# Patient Record
Sex: FEMALE | Race: WHITE | Employment: FULL TIME | URBAN - METROPOLITAN AREA
[De-identification: names, ages, dates, MRNs, and addresses within clinical notes are randomized per-mention and may not be internally consistent; named-entity substitution may affect disease eponyms.]

---

## 2018-09-10 ENCOUNTER — HOSPITAL ENCOUNTER (EMERGENCY)
Age: 48
Discharge: HOME | End: 2018-09-10
Payer: COMMERCIAL

## 2018-09-10 VITALS
SYSTOLIC BLOOD PRESSURE: 152 MMHG | RESPIRATION RATE: 18 BRPM | TEMPERATURE: 97.3 F | OXYGEN SATURATION: 100 % | HEIGHT: 66 IN | HEART RATE: 91 BPM | WEIGHT: 160 LBS | DIASTOLIC BLOOD PRESSURE: 84 MMHG | BODY MASS INDEX: 25.71 KG/M2

## 2018-09-10 DIAGNOSIS — M54.41 ACUTE RIGHT-SIDED LOW BACK PAIN WITH RIGHT-SIDED SCIATICA: Primary | ICD-10-CM

## 2018-09-10 PROCEDURE — 6370000000 HC RX 637 (ALT 250 FOR IP): Performed by: PHYSICIAN ASSISTANT

## 2018-09-10 PROCEDURE — 99283 EMERGENCY DEPT VISIT LOW MDM: CPT

## 2018-09-10 RX ORDER — LIDOCAINE 50 MG/G
1 PATCH TOPICAL ONCE
Status: DISCONTINUED | OUTPATIENT
Start: 2018-09-10 | End: 2018-09-10 | Stop reason: HOSPADM

## 2018-09-10 RX ORDER — METHYLPREDNISOLONE 4 MG/1
TABLET ORAL
Qty: 1 KIT | Refills: 0 | Status: SHIPPED | OUTPATIENT
Start: 2018-09-10 | End: 2018-09-16

## 2018-09-10 RX ORDER — CYCLOBENZAPRINE HCL 10 MG
10 TABLET ORAL 3 TIMES DAILY PRN
Qty: 20 TABLET | Refills: 0 | Status: SHIPPED | OUTPATIENT
Start: 2018-09-10 | End: 2018-09-17

## 2018-09-10 RX ORDER — NAPROXEN 500 MG/1
500 TABLET ORAL 2 TIMES DAILY
Qty: 20 TABLET | Refills: 0 | Status: SHIPPED | OUTPATIENT
Start: 2018-09-10 | End: 2020-06-10

## 2018-09-10 RX ORDER — LIDOCAINE 50 MG/G
1 PATCH TOPICAL DAILY
Qty: 30 PATCH | Refills: 0 | Status: ON HOLD | OUTPATIENT
Start: 2018-09-10 | End: 2021-11-30 | Stop reason: HOSPADM

## 2018-09-10 ASSESSMENT — PAIN SCALES - GENERAL: PAINLEVEL_OUTOF10: 7

## 2018-09-10 ASSESSMENT — PAIN DESCRIPTION - LOCATION: LOCATION: BACK

## 2018-09-10 ASSESSMENT — PAIN DESCRIPTION - PAIN TYPE: TYPE: ACUTE PAIN

## 2018-09-11 NOTE — PROGRESS NOTES
Lidoderm patch applied. Explained to patient, disharge instructions and medications reviewed. Patient with no questions at this time. Patient able to ambulate to exit with family.

## 2018-09-11 NOTE — ED PROVIDER NOTES
laceration. Neurological: She is alert and oriented to person, place, and time. Reflex Scores:       Patellar reflexes are 2+ on the right side and 2+ on the left side. Achilles reflexes are 2+ on the right side and 2+ on the left side. Bilateral lower extremity strength is 5 out of 5 and is symmetric  No sensory deficit in her lower extremities. Skin: Skin is warm and dry. She is not diaphoretic. No pallor. Psychiatric: She has a normal mood and affect. Her behavior is normal. Thought content normal.   Nursing note and vitals reviewed. DIAGNOSTIC RESULTS   LABS:    Labs Reviewed - No data to display    All other labs were within normal range or not returned as of this dictation. EKG: All EKG's are interpreted by the Emergency Department Physician who either signs or Co-signs this chart in the absence of a cardiologist.  Please see their note for interpretation of EKG. RADIOLOGY:   Non-plain film images such as CT, Ultrasound and MRI are read by the radiologist. Plain radiographic images are visualized and preliminarily interpreted by the  ED Provider with the below findings:        Interpretation per the Radiologist below, if available at the time of this note:    No orders to display     No results found. PROCEDURES   Unless otherwise noted below, none     Procedures    CRITICAL CARE TIME   N/A    CONSULTS:  None      EMERGENCY DEPARTMENT COURSE and DIFFERENTIAL DIAGNOSIS/MDM:   Vitals:    Vitals:    09/10/18 1953   BP: (!) 152/84   Pulse: 91   Resp: 18   Temp: 97.3 °F (36.3 °C)   TempSrc: Tympanic   SpO2: 100%   Weight: 160 lb (72.6 kg)   Height: 5' 6\" (1.676 m)       Patient was given the following medications:  Medications - No data to display    This patient has back pain which appears to be musculoskeletal.  She is tender with a specimen her right lower back. No evidence for sinusitis, erysipelas, abscess, necrotizing fasciitis or lymphangitis.   She has no bony tenderness and

## 2020-05-28 ENCOUNTER — TELEPHONE (OUTPATIENT)
Dept: OBGYN CLINIC | Age: 50
End: 2020-05-28

## 2020-05-29 ENCOUNTER — OFFICE VISIT (OUTPATIENT)
Dept: OBGYN CLINIC | Age: 50
End: 2020-05-29
Payer: COMMERCIAL

## 2020-05-29 VITALS
HEART RATE: 99 BPM | DIASTOLIC BLOOD PRESSURE: 82 MMHG | HEIGHT: 66 IN | SYSTOLIC BLOOD PRESSURE: 132 MMHG | WEIGHT: 182.2 LBS | TEMPERATURE: 98.3 F | BODY MASS INDEX: 29.28 KG/M2

## 2020-05-29 LAB
BILIRUBIN URINE: NEGATIVE
BLOOD, URINE: NEGATIVE
CLARITY: CLEAR
COLOR: YELLOW
EPITHELIAL CELLS, UA: 1 /HPF (ref 0–5)
GLUCOSE URINE: NEGATIVE MG/DL
HYALINE CASTS: 2 /LPF (ref 0–8)
KETONES, URINE: NEGATIVE MG/DL
LEUKOCYTE ESTERASE, URINE: NEGATIVE
MICROSCOPIC EXAMINATION: NORMAL
NITRITE, URINE: NEGATIVE
PH UA: 7 (ref 5–8)
PROTEIN UA: NEGATIVE MG/DL
RBC UA: 1 /HPF (ref 0–4)
SPECIFIC GRAVITY UA: 1.01 (ref 1–1.03)
URINE TYPE: NORMAL
UROBILINOGEN, URINE: 0.2 E.U./DL
WBC UA: 2 /HPF (ref 0–5)

## 2020-05-29 PROCEDURE — 99204 OFFICE O/P NEW MOD 45 MIN: CPT | Performed by: OBSTETRICS & GYNECOLOGY

## 2020-05-29 PROCEDURE — 76856 US EXAM PELVIC COMPLETE: CPT | Performed by: OBSTETRICS & GYNECOLOGY

## 2020-05-29 NOTE — LETTER
I understand that if I am not forthcoming with information regarding a pain contract currently in place, it may result in a violation of the contract with that provider. The violation may result in cessation of prescribing any controlled medications. Violation may also result in the dismissal of care from the [facility] provider. It is your responsibility as the patient under contract to understand the contract and adhere to the agreement.      Patient Signature: __________________________________  Date: ________________

## 2020-05-29 NOTE — LETTER
Clindamycin 900 mg IVPB + Gentamicin 1.5 mg/kg IVPB x 1    Metronidazole 500 mg IVPB x1 and Gentamicin 1.5 mg/kg IVPB x1   ADDITIONAL MEDICATIONS:    DVT PREVENTION:       ? Knee Antithrombic compression wraps    Additional Orders: _____________________________________________________________________ ________________________________________________________________________________                Signature _____________________________________________Date _____________________    Please fax at time of booking the case to the Scheduling office at [phone] [facility] at [phone]                                             Updated: 3/2015      PRE-OPERATIVE / PROCEDURE HISTORY & PHYSICAL  FAX TO: ([phone]    Patient Name:___Masonavis KODY Mena_______________     :___1970_______    Date:___________________     Surgeon:_Dr. Mayra Parkinson____________  CHIEF COMPLAINT: _abnormal uterine bleeding, pelvic pain, urinary incontinence___  PLANNED SURGICAL PROCEDURE:__Laparoscopic assisted vaginal hysterectomy with bilateral salpingectomy, possible anterior repair, possible cystoscopy, possible posterior repair__________  Date of Procedure:___2020___________________    Medical History:   ? Hypertension      ? Hyperlipidemia     ? Diabetes Mellitus    ? Coronary Artery Disease  ______________________________________________________________________  ______________________________________________________________________    Surgical History:  ______________________________________________________________________  ______________________________________________________________________  Social Hx:        ?   Smoker ppd_____       ? Alcohol, drinks/day_____  Family Hx:       ? Anesthesia problems      ? Bleeding problems     ? Clotting problems                           ?    Other ___________________________________________________    ROS: ______________________________________________________________________  ______________________________________________________________________    Medications: (Name/Dose/Frequency):  ______________________________________________________________________  ______________________________________________________________________  ______________________________________________________________________    Allergies:  ______________________________________________________________________    Physical Exam:    Vital Signs:   BP______P______T______Resp_____Ht______Wt______BMI_______    General:  HEENT:  Neck:                                                                                             Lymph:  CV:                                                                                                Skin:  Pulmonary:                                                                                   GYN:  Abdomen:                                                                                     Extremities:  Neuro:          Patient Name: __Shraddha Mena______________ : ______1970______      Impression:                    Plan of Care:                  Okay for Planned Surgery/Anesthesia:_____________________________      Perioperative Beta-Blocker therapy should be initiated in at-risk patients undergoing major abdominal or vascular procedures, joint replacement, or major chest procedures. At-risk patients are those with established CAD, DM, or have two or more risk factors  age >71, smoker, HTN or hyperlipidemia. Recommendations:  ? If on Beta-Blocker already, continue current drug and titrate to target  ? If not currently on Beta-Blocker, recommend Atenolol or Metoprolol and titrate to target (target systolic BP <522, Pulse <81)  ?  Please begin therapy prior to surgery and titrate to goal  ? Beta-Blocker should be continued for at least 30 days post-operatively as tolerated ? Prescription and therapy instituted  ? The patient would benefit from Beta-Blocker therapy, but their use is contraindicated (i.e. reactive airway disease exacerbated by Beta-Blockers, 2nd or 3rd degree heart block, severe aortic stenosis, other______________________________)  ?  The patient is to continue their current Beta-Blocker therapy        Physician Signature: _______________________ Date: __________Time:_________

## 2020-05-29 NOTE — PROGRESS NOTES
Negative for abdominal pain, blood in stool and constipation. Endocrine: Negative for cold intolerance and heat intolerance. Genitourinary: Positive for menstrual problem, pelvic pain and vaginal bleeding. Musculoskeletal: Negative for back pain. Skin: Negative for color change. Neurological: Negative for dizziness, light-headedness and headaches. Hematological: Does not bruise/bleed easily. Psychiatric/Behavioral: Negative for behavioral problems and dysphoric mood. The patient is not nervous/anxious. Gynecologic History:   (+) history of abnormal pap smears     Last PAP 15 yrs ago    Pre cancer cells noted at 27 yo, laser therapy   Denies  history of of STIs   Last Mammogram age 44 - normal     Menstrual history:  Gynecologic History  Menstrual History: see above     Sexual History:   Contraception: Tubal     Currently IS sexually active  o Man x 30 yrs    (+) sexual problems - dryness /occasional discomfort     Obstetrical History:  OB History        3    Para   3    Term   3            AB        Living           SAB        TAB        Ectopic        Molar        Multiple        Live Births                    Past Medical History:   Past Medical History:   Diagnosis Date    Abnormal Pap smear of cervix     Menopausal symptoms    - Pre cancer cells noted at 27 yo, laser therapy       Medications:  Current Outpatient Medications   Medication Sig Dispense Refill    lidocaine (LIDODERM) 5 % Place 1 patch onto the skin daily 12 hours on, 12 hours off. (Patient not taking: Reported on 6/10/2020) 30 patch 0    vitamin B-12 (CYANOCOBALAMIN) 100 MCG tablet Take 50 mcg by mouth daily      meclizine (ANTIVERT) 50 MG tablet Take 1 tablet by mouth 2 times daily as needed for Dizziness or Nausea (Patient not taking: Reported on 6/10/2020) 10 tablet 0     No current facility-administered medications for this visit.         Allergies:  Penicillins    Surgical History:  Past Surgical

## 2020-05-31 LAB
ORGANISM: ABNORMAL
URINE CULTURE, ROUTINE: ABNORMAL

## 2020-06-01 ENCOUNTER — TELEPHONE (OUTPATIENT)
Dept: FAMILY MEDICINE CLINIC | Age: 50
End: 2020-06-01

## 2020-06-01 NOTE — TELEPHONE ENCOUNTER
Pt is requesting to establish with [facility], would you be willing to accept?  Please advise    [phone]

## 2020-06-03 LAB
HPV COMMENT: NORMAL
HPV TYPE 16: NOT DETECTED
HPV TYPE 18: NOT DETECTED
HPVOH (OTHER TYPES): NOT DETECTED

## 2020-06-09 ENCOUNTER — TELEPHONE (OUTPATIENT)
Dept: OBGYN CLINIC | Age: 50
End: 2020-06-09

## 2020-06-10 ENCOUNTER — OFFICE VISIT (OUTPATIENT)
Dept: OBGYN CLINIC | Age: 50
End: 2020-06-10

## 2020-06-10 VITALS
WEIGHT: 181.2 LBS | HEART RATE: 93 BPM | DIASTOLIC BLOOD PRESSURE: 82 MMHG | BODY MASS INDEX: 29.25 KG/M2 | SYSTOLIC BLOOD PRESSURE: 136 MMHG | TEMPERATURE: 98.5 F

## 2020-06-10 PROCEDURE — 99024 POSTOP FOLLOW-UP VISIT: CPT | Performed by: OBSTETRICS & GYNECOLOGY

## 2020-06-10 RX ORDER — POLYETHYLENE GLYCOL 3350, SODIUM SULFATE ANHYDROUS, SODIUM BICARBONATE, SODIUM CHLORIDE, POTASSIUM CHLORIDE 236; 22.74; 6.74; 5.86; 2.97 G/4L; G/4L; G/4L; G/4L; G/4L
4 POWDER, FOR SOLUTION ORAL ONCE
Qty: 4000 ML | Refills: 0 | Status: SHIPPED | OUTPATIENT
Start: 2020-06-10 | End: 2020-06-10

## 2020-06-14 PROBLEM — N93.9 ABNORMAL UTERINE BLEEDING (AUB): Status: ACTIVE | Noted: 2020-06-14

## 2020-06-14 PROBLEM — N81.11 CYSTOCELE, MIDLINE: Status: ACTIVE | Noted: 2020-06-14

## 2020-06-14 PROBLEM — R10.2 PELVIC PAIN: Status: ACTIVE | Noted: 2020-06-14

## 2020-06-14 PROBLEM — D25.1 INTRAMURAL LEIOMYOMA OF UTERUS: Status: ACTIVE | Noted: 2020-06-14

## 2020-06-14 ASSESSMENT — ENCOUNTER SYMPTOMS
ABDOMINAL PAIN: 0
BLOOD IN STOOL: 0
CONSTIPATION: 0
SHORTNESS OF BREATH: 0
WHEEZING: 0
COLOR CHANGE: 0
BACK PAIN: 0

## 2020-06-15 ENCOUNTER — OFFICE VISIT (OUTPATIENT)
Dept: PRIMARY CARE CLINIC | Age: 50
End: 2020-06-15

## 2020-06-15 ENCOUNTER — OFFICE VISIT (OUTPATIENT)
Dept: FAMILY MEDICINE CLINIC | Age: 50
End: 2020-06-15
Payer: COMMERCIAL

## 2020-06-15 VITALS
BODY MASS INDEX: 23.78 KG/M2 | SYSTOLIC BLOOD PRESSURE: 134 MMHG | HEART RATE: 95 BPM | OXYGEN SATURATION: 99 % | HEIGHT: 66 IN | TEMPERATURE: 98.5 F | DIASTOLIC BLOOD PRESSURE: 86 MMHG | WEIGHT: 148 LBS

## 2020-06-15 PROCEDURE — 99242 OFF/OP CONSLTJ NEW/EST SF 20: CPT | Performed by: PHYSICIAN ASSISTANT

## 2020-06-15 ASSESSMENT — PATIENT HEALTH QUESTIONNAIRE - PHQ9
SUM OF ALL RESPONSES TO PHQ QUESTIONS 1-9: 0
2. FEELING DOWN, DEPRESSED OR HOPELESS: 0
SUM OF ALL RESPONSES TO PHQ QUESTIONS 1-9: 0
SUM OF ALL RESPONSES TO PHQ9 QUESTIONS 1 & 2: 0
1. LITTLE INTEREST OR PLEASURE IN DOING THINGS: 0

## 2020-06-15 NOTE — PROGRESS NOTES
[facility]   Pre-operative History and Physical      DIAGNOSIS:     Diagnosis Orders   1. Preop examination     2. Abnormal vaginal bleeding           PROCEDURE:  hysterectomy      History Obtained From:  patient    HISTORY OF PRESENT ILLNESS:    Angella Delgadillo 1970 is a 53 y.o. female who presents for pre-operative evaluation    Past Medical History:    Past Medical History:   Diagnosis Date    Abnormal Pap smear of cervix     Menopausal symptoms      Past Surgical History:    Past Surgical History:   Procedure Laterality Date    TUBAL LIGATION      WISDOM TOOTH EXTRACTION         Current Medication  Current Outpatient Medications   Medication Sig Dispense Refill    vitamin B-12 (CYANOCOBALAMIN) 100 MCG tablet Take 50 mcg by mouth daily      lidocaine (LIDODERM) 5 % Place 1 patch onto the skin daily 12 hours on, 12 hours off. (Patient not taking: Reported on 6/10/2020) 30 patch 0    meclizine (ANTIVERT) 50 MG tablet Take 1 tablet by mouth 2 times daily as needed for Dizziness or Nausea (Patient not taking: Reported on 6/10/2020) 10 tablet 0     No current facility-administered medications for this visit. Allergies:  Penicillins  History of allergic reaction to anesthesia:  No  Teeth: no dentures or caps    Social History:   Social History     Tobacco Use   Smoking Status Never Smoker   Smokeless Tobacco Never Used     The patient states she drinks 0 per week.   Family History:   Family History   Problem Relation Age of Onset    Heart Attack Paternal Grandfather     Dementia Paternal Grandmother     Osteoporosis Maternal Grandmother     No Known Problems Maternal Grandfather     Cancer Mother        REVIEW OF SYSTEMS:    CONSTITUTIONAL:  negative  EYES:  negative  HEENT:  negative  RESPIRATORY:  negative  CARDIOVASCULAR:  negative  GASTROINTESTINAL:  negative  GENITOURINARY:  negative  INTEGUMENT/BREAST:  negative  HEMATOLOGIC/LYMPHATIC:  negative  ALLERGIC/IMMUNOLOGIC:

## 2020-06-15 NOTE — PROGRESS NOTES
Patient is having a/an HYSTERECTOMY with Dr. Jakub Mcclellan on 6/18/2020 and was seen at clinic for pre op covid test. . Patient instructed to self quarantine until the procedure.

## 2020-06-15 NOTE — PROGRESS NOTES
Preoperative Screening for Elective Surgery/Invasive Procedures While COVID-19 present in the community     Have you tested positive or have been told to self-isolate for COVID-19 like symptoms within the past 28 days? NO   Do you currently have any of the following symptoms? NO  o Fever >100.0 F or 99.9 F in immunocompromised patients? NO  o New onset cough, shortness of breath or difficulty breathing? NO  o New onset sore throat, myalgia (muscle aches and pains), headache, loss of taste/smell or diarrhea? NO   Have you had a potential exposure to COVID-19 within the past 14 days by:NO  o Close contact with a confirmed case? NO  o Close contact with a healthcare worker,  or essential infrastructure worker (grocery store, [employer], gas station, public utilities or transportation)? NO  o Do you reside in a congregate setting such as; skilled nursing facility, adult home, correctional facility, homeless shelter or other institutional setting? NO  o Have you had recent travel to a known COVID-19 hotspot? NO    Indicate if the patient has a positive screen by answering yes to one or more of the above questions. Patients who test positive or screen positive prior to surgery or on the day of surgery should be evaluated in conjunction with the surgeon/proceduralist/anesthesiologist to determine the urgency of the procedure.

## 2020-06-17 ENCOUNTER — ANESTHESIA EVENT (OUTPATIENT)
Dept: OPERATING ROOM | Age: 50
End: 2020-06-17
Payer: COMMERCIAL

## 2020-06-17 LAB
SARS-COV-2: NOT DETECTED
SOURCE: NORMAL

## 2020-06-18 ENCOUNTER — HOSPITAL ENCOUNTER (OUTPATIENT)
Age: 50
Setting detail: OBSERVATION
Discharge: HOME | End: 2020-06-20
Attending: OBSTETRICS & GYNECOLOGY | Admitting: OBSTETRICS & GYNECOLOGY
Payer: COMMERCIAL

## 2020-06-18 ENCOUNTER — ANESTHESIA (OUTPATIENT)
Dept: OPERATING ROOM | Age: 50
End: 2020-06-18
Payer: COMMERCIAL

## 2020-06-18 VITALS
OXYGEN SATURATION: 100 % | TEMPERATURE: 98.6 F | RESPIRATION RATE: 14 BRPM | SYSTOLIC BLOOD PRESSURE: 125 MMHG | DIASTOLIC BLOOD PRESSURE: 53 MMHG

## 2020-06-18 LAB
ABO/RH: NORMAL
ABO/RH: NORMAL
ANION GAP SERPL CALCULATED.3IONS-SCNC: 12 MMOL/L (ref 3–16)
ANTIBODY SCREEN: NORMAL
BUN BLDV-MCNC: 7 MG/DL (ref 7–20)
CALCIUM SERPL-MCNC: 9 MG/DL (ref 8.3–10.6)
CHLORIDE BLD-SCNC: 107 MMOL/L (ref 99–110)
CO2: 20 MMOL/L (ref 21–32)
CREAT SERPL-MCNC: 0.6 MG/DL (ref 0.6–1.1)
GFR AFRICAN AMERICAN: >60
GFR NON-AFRICAN AMERICAN: >60
GLUCOSE BLD-MCNC: 103 MG/DL (ref 70–99)
HCT VFR BLD CALC: 21.9 % (ref 36–48)
HCT VFR BLD CALC: 26 % (ref 36–48)
HCT VFR BLD CALC: 27.5 % (ref 36–48)
HEMATOLOGY PATH CONSULT: NORMAL
HEMATOLOGY PATH CONSULT: YES
HEMOGLOBIN: 6.2 G/DL (ref 12–16)
HEMOGLOBIN: 7.7 G/DL (ref 12–16)
HEMOGLOBIN: 8 G/DL (ref 12–16)
MCH RBC QN AUTO: 17.1 PG (ref 26–34)
MCHC RBC AUTO-ENTMCNC: 28.1 G/DL (ref 31–36)
MCV RBC AUTO: 60.9 FL (ref 80–100)
PDW BLD-RTO: 17.5 % (ref 12.4–15.4)
PLATELET # BLD: 532 K/UL (ref 135–450)
PLATELET SLIDE REVIEW: ABNORMAL
PMV BLD AUTO: 7.4 FL (ref 5–10.5)
POTASSIUM REFLEX MAGNESIUM: 3.9 MMOL/L (ref 3.5–5.1)
PREGNANCY, URINE: NEGATIVE
RBC # BLD: 3.59 M/UL (ref 4–5.2)
SLIDE REVIEW: ABNORMAL
SODIUM BLD-SCNC: 139 MMOL/L (ref 136–145)
WBC # BLD: 7.3 K/UL (ref 4–11)

## 2020-06-18 PROCEDURE — 86901 BLOOD TYPING SEROLOGIC RH(D): CPT

## 2020-06-18 PROCEDURE — 58573 TLH W/T/O UTERUS OVER 250 G: CPT | Performed by: OBSTETRICS & GYNECOLOGY

## 2020-06-18 PROCEDURE — 3600000004 HC SURGERY LEVEL 4 BASE: Performed by: OBSTETRICS & GYNECOLOGY

## 2020-06-18 PROCEDURE — 2720000010 HC SURG SUPPLY STERILE: Performed by: OBSTETRICS & GYNECOLOGY

## 2020-06-18 PROCEDURE — 6360000002 HC RX W HCPCS

## 2020-06-18 PROCEDURE — 2500000003 HC RX 250 WO HCPCS: Performed by: NURSE ANESTHETIST, CERTIFIED REGISTERED

## 2020-06-18 PROCEDURE — 85014 HEMATOCRIT: CPT

## 2020-06-18 PROCEDURE — 2700000000 HC OXYGEN THERAPY PER DAY

## 2020-06-18 PROCEDURE — 3700000000 HC ANESTHESIA ATTENDED CARE: Performed by: OBSTETRICS & GYNECOLOGY

## 2020-06-18 PROCEDURE — 86900 BLOOD TYPING SEROLOGIC ABO: CPT

## 2020-06-18 PROCEDURE — 2500000003 HC RX 250 WO HCPCS: Performed by: OBSTETRICS & GYNECOLOGY

## 2020-06-18 PROCEDURE — 6360000002 HC RX W HCPCS: Performed by: OBSTETRICS & GYNECOLOGY

## 2020-06-18 PROCEDURE — G0378 HOSPITAL OBSERVATION PER HR: HCPCS

## 2020-06-18 PROCEDURE — 6360000002 HC RX W HCPCS: Performed by: ANESTHESIOLOGY

## 2020-06-18 PROCEDURE — 80048 BASIC METABOLIC PNL TOTAL CA: CPT

## 2020-06-18 PROCEDURE — 3600000014 HC SURGERY LEVEL 4 ADDTL 15MIN: Performed by: OBSTETRICS & GYNECOLOGY

## 2020-06-18 PROCEDURE — 2580000003 HC RX 258: Performed by: NURSE ANESTHETIST, CERTIFIED REGISTERED

## 2020-06-18 PROCEDURE — 86850 RBC ANTIBODY SCREEN: CPT

## 2020-06-18 PROCEDURE — 2580000003 HC RX 258: Performed by: ANESTHESIOLOGY

## 2020-06-18 PROCEDURE — 6370000000 HC RX 637 (ALT 250 FOR IP): Performed by: OBSTETRICS & GYNECOLOGY

## 2020-06-18 PROCEDURE — P9016 RBC LEUKOCYTES REDUCED: HCPCS

## 2020-06-18 PROCEDURE — 84703 CHORIONIC GONADOTROPIN ASSAY: CPT

## 2020-06-18 PROCEDURE — 94761 N-INVAS EAR/PLS OXIMETRY MLT: CPT

## 2020-06-18 PROCEDURE — 6360000002 HC RX W HCPCS: Performed by: NURSE ANESTHETIST, CERTIFIED REGISTERED

## 2020-06-18 PROCEDURE — 2580000003 HC RX 258: Performed by: OBSTETRICS & GYNECOLOGY

## 2020-06-18 PROCEDURE — 36415 COLL VENOUS BLD VENIPUNCTURE: CPT

## 2020-06-18 PROCEDURE — 85027 COMPLETE CBC AUTOMATED: CPT

## 2020-06-18 PROCEDURE — 85018 HEMOGLOBIN: CPT

## 2020-06-18 PROCEDURE — 88307 TISSUE EXAM BY PATHOLOGIST: CPT

## 2020-06-18 PROCEDURE — 3700000001 HC ADD 15 MINUTES (ANESTHESIA): Performed by: OBSTETRICS & GYNECOLOGY

## 2020-06-18 PROCEDURE — 7100000001 HC PACU RECOVERY - ADDTL 15 MIN: Performed by: OBSTETRICS & GYNECOLOGY

## 2020-06-18 PROCEDURE — 86923 COMPATIBILITY TEST ELECTRIC: CPT

## 2020-06-18 PROCEDURE — 7100000000 HC PACU RECOVERY - FIRST 15 MIN: Performed by: OBSTETRICS & GYNECOLOGY

## 2020-06-18 PROCEDURE — 2709999900 HC NON-CHARGEABLE SUPPLY: Performed by: OBSTETRICS & GYNECOLOGY

## 2020-06-18 RX ORDER — LABETALOL HYDROCHLORIDE 5 MG/ML
5 INJECTION, SOLUTION INTRAVENOUS EVERY 10 MIN PRN
Status: DISCONTINUED | OUTPATIENT
Start: 2020-06-18 | End: 2020-06-18 | Stop reason: HOSPADM

## 2020-06-18 RX ORDER — MAGNESIUM HYDROXIDE 1200 MG/15ML
LIQUID ORAL CONTINUOUS PRN
Status: COMPLETED | OUTPATIENT
Start: 2020-06-18 | End: 2020-06-18

## 2020-06-18 RX ORDER — DEXAMETHASONE SODIUM PHOSPHATE 10 MG/ML
INJECTION INTRAMUSCULAR; INTRAVENOUS PRN
Status: DISCONTINUED | OUTPATIENT
Start: 2020-06-18 | End: 2020-06-18 | Stop reason: SDUPTHER

## 2020-06-18 RX ORDER — ACETAMINOPHEN 325 MG/1
650 TABLET ORAL EVERY 4 HOURS PRN
Status: DISCONTINUED | OUTPATIENT
Start: 2020-06-18 | End: 2020-06-20 | Stop reason: HOSPADM

## 2020-06-18 RX ORDER — LIDOCAINE HYDROCHLORIDE 20 MG/ML
INJECTION, SOLUTION INFILTRATION; PERINEURAL PRN
Status: DISCONTINUED | OUTPATIENT
Start: 2020-06-18 | End: 2020-06-18 | Stop reason: SDUPTHER

## 2020-06-18 RX ORDER — SODIUM CHLORIDE 0.9 % (FLUSH) 0.9 %
10 SYRINGE (ML) INJECTION EVERY 12 HOURS SCHEDULED
Status: DISCONTINUED | OUTPATIENT
Start: 2020-06-18 | End: 2020-06-18 | Stop reason: HOSPADM

## 2020-06-18 RX ORDER — HYDROMORPHONE HCL 110MG/55ML
0.5 PATIENT CONTROLLED ANALGESIA SYRINGE INTRAVENOUS
Status: DISCONTINUED | OUTPATIENT
Start: 2020-06-18 | End: 2020-06-20 | Stop reason: HOSPADM

## 2020-06-18 RX ORDER — KETOROLAC TROMETHAMINE 30 MG/ML
30 INJECTION, SOLUTION INTRAMUSCULAR; INTRAVENOUS EVERY 6 HOURS
Status: DISCONTINUED | OUTPATIENT
Start: 2020-06-18 | End: 2020-06-19

## 2020-06-18 RX ORDER — DIPHENHYDRAMINE HYDROCHLORIDE 50 MG/ML
12.5 INJECTION INTRAMUSCULAR; INTRAVENOUS
Status: DISCONTINUED | OUTPATIENT
Start: 2020-06-18 | End: 2020-06-18 | Stop reason: HOSPADM

## 2020-06-18 RX ORDER — HYDROMORPHONE HCL 110MG/55ML
PATIENT CONTROLLED ANALGESIA SYRINGE INTRAVENOUS PRN
Status: DISCONTINUED | OUTPATIENT
Start: 2020-06-18 | End: 2020-06-18 | Stop reason: SDUPTHER

## 2020-06-18 RX ORDER — 0.9 % SODIUM CHLORIDE 0.9 %
250 INTRAVENOUS SOLUTION INTRAVENOUS ONCE
Status: COMPLETED | OUTPATIENT
Start: 2020-06-18 | End: 2020-06-20

## 2020-06-18 RX ORDER — OXYCODONE HYDROCHLORIDE AND ACETAMINOPHEN 5; 325 MG/1; MG/1
1 TABLET ORAL EVERY 4 HOURS PRN
Status: DISCONTINUED | OUTPATIENT
Start: 2020-06-18 | End: 2020-06-20 | Stop reason: HOSPADM

## 2020-06-18 RX ORDER — SODIUM CHLORIDE 0.9 % (FLUSH) 0.9 %
10 SYRINGE (ML) INJECTION EVERY 12 HOURS SCHEDULED
Status: DISCONTINUED | OUTPATIENT
Start: 2020-06-18 | End: 2020-06-20 | Stop reason: HOSPADM

## 2020-06-18 RX ORDER — ONDANSETRON 2 MG/ML
INJECTION INTRAMUSCULAR; INTRAVENOUS PRN
Status: DISCONTINUED | OUTPATIENT
Start: 2020-06-18 | End: 2020-06-18 | Stop reason: SDUPTHER

## 2020-06-18 RX ORDER — LIDOCAINE HYDROCHLORIDE 10 MG/ML
1 INJECTION, SOLUTION EPIDURAL; INFILTRATION; INTRACAUDAL; PERINEURAL
Status: DISCONTINUED | OUTPATIENT
Start: 2020-06-18 | End: 2020-06-18 | Stop reason: HOSPADM

## 2020-06-18 RX ORDER — PROMETHAZINE HYDROCHLORIDE 25 MG/1
12.5 TABLET ORAL EVERY 6 HOURS PRN
Status: DISCONTINUED | OUTPATIENT
Start: 2020-06-18 | End: 2020-06-20 | Stop reason: HOSPADM

## 2020-06-18 RX ORDER — ONDANSETRON 2 MG/ML
4 INJECTION INTRAMUSCULAR; INTRAVENOUS PRN
Status: DISCONTINUED | OUTPATIENT
Start: 2020-06-18 | End: 2020-06-18 | Stop reason: HOSPADM

## 2020-06-18 RX ORDER — SODIUM CHLORIDE, SODIUM LACTATE, POTASSIUM CHLORIDE, CALCIUM CHLORIDE 600; 310; 30; 20 MG/100ML; MG/100ML; MG/100ML; MG/100ML
INJECTION, SOLUTION INTRAVENOUS CONTINUOUS
Status: DISCONTINUED | OUTPATIENT
Start: 2020-06-18 | End: 2020-06-18

## 2020-06-18 RX ORDER — MIDAZOLAM HYDROCHLORIDE 1 MG/ML
INJECTION INTRAMUSCULAR; INTRAVENOUS PRN
Status: DISCONTINUED | OUTPATIENT
Start: 2020-06-18 | End: 2020-06-18 | Stop reason: SDUPTHER

## 2020-06-18 RX ORDER — ONDANSETRON 2 MG/ML
4 INJECTION INTRAMUSCULAR; INTRAVENOUS EVERY 6 HOURS PRN
Status: DISCONTINUED | OUTPATIENT
Start: 2020-06-18 | End: 2020-06-20 | Stop reason: HOSPADM

## 2020-06-18 RX ORDER — MORPHINE SULFATE 2 MG/ML
1 INJECTION, SOLUTION INTRAMUSCULAR; INTRAVENOUS EVERY 5 MIN PRN
Status: DISCONTINUED | OUTPATIENT
Start: 2020-06-18 | End: 2020-06-18 | Stop reason: HOSPADM

## 2020-06-18 RX ORDER — CEFAZOLIN SODIUM 1 G/3ML
INJECTION, POWDER, FOR SOLUTION INTRAMUSCULAR; INTRAVENOUS PRN
Status: DISCONTINUED | OUTPATIENT
Start: 2020-06-18 | End: 2020-06-18 | Stop reason: SDUPTHER

## 2020-06-18 RX ORDER — ONDANSETRON 2 MG/ML
INJECTION INTRAMUSCULAR; INTRAVENOUS
Status: DISPENSED
Start: 2020-06-18 | End: 2020-06-18

## 2020-06-18 RX ORDER — MAGNESIUM HYDROXIDE 1200 MG/15ML
LIQUID ORAL PRN
Status: DISCONTINUED | OUTPATIENT
Start: 2020-06-18 | End: 2020-06-18 | Stop reason: HOSPADM

## 2020-06-18 RX ORDER — FENTANYL CITRATE 50 UG/ML
50 INJECTION, SOLUTION INTRAMUSCULAR; INTRAVENOUS EVERY 10 MIN PRN
Status: DISCONTINUED | OUTPATIENT
Start: 2020-06-18 | End: 2020-06-20 | Stop reason: HOSPADM

## 2020-06-18 RX ORDER — PROPOFOL 10 MG/ML
INJECTION, EMULSION INTRAVENOUS PRN
Status: DISCONTINUED | OUTPATIENT
Start: 2020-06-18 | End: 2020-06-18 | Stop reason: SDUPTHER

## 2020-06-18 RX ORDER — IBUPROFEN 400 MG/1
800 TABLET ORAL EVERY 8 HOURS PRN
Status: DISCONTINUED | OUTPATIENT
Start: 2020-06-18 | End: 2020-06-20 | Stop reason: HOSPADM

## 2020-06-18 RX ORDER — BUPIVACAINE HYDROCHLORIDE AND EPINEPHRINE 5; 5 MG/ML; UG/ML
INJECTION, SOLUTION PERINEURAL PRN
Status: DISCONTINUED | OUTPATIENT
Start: 2020-06-18 | End: 2020-06-18 | Stop reason: HOSPADM

## 2020-06-18 RX ORDER — PROMETHAZINE HYDROCHLORIDE 25 MG/ML
6.25 INJECTION, SOLUTION INTRAMUSCULAR; INTRAVENOUS
Status: DISCONTINUED | OUTPATIENT
Start: 2020-06-18 | End: 2020-06-18 | Stop reason: HOSPADM

## 2020-06-18 RX ORDER — OXYCODONE HYDROCHLORIDE AND ACETAMINOPHEN 5; 325 MG/1; MG/1
2 TABLET ORAL PRN
Status: DISCONTINUED | OUTPATIENT
Start: 2020-06-18 | End: 2020-06-18 | Stop reason: HOSPADM

## 2020-06-18 RX ORDER — FENTANYL CITRATE 50 UG/ML
INJECTION, SOLUTION INTRAMUSCULAR; INTRAVENOUS PRN
Status: DISCONTINUED | OUTPATIENT
Start: 2020-06-18 | End: 2020-06-18 | Stop reason: SDUPTHER

## 2020-06-18 RX ORDER — DOCUSATE SODIUM 100 MG/1
100 CAPSULE, LIQUID FILLED ORAL 2 TIMES DAILY
Status: DISCONTINUED | OUTPATIENT
Start: 2020-06-18 | End: 2020-06-20 | Stop reason: HOSPADM

## 2020-06-18 RX ORDER — HYDRALAZINE HYDROCHLORIDE 20 MG/ML
5 INJECTION INTRAMUSCULAR; INTRAVENOUS EVERY 10 MIN PRN
Status: DISCONTINUED | OUTPATIENT
Start: 2020-06-18 | End: 2020-06-18 | Stop reason: HOSPADM

## 2020-06-18 RX ORDER — ROCURONIUM BROMIDE 10 MG/ML
INJECTION, SOLUTION INTRAVENOUS PRN
Status: DISCONTINUED | OUTPATIENT
Start: 2020-06-18 | End: 2020-06-18 | Stop reason: SDUPTHER

## 2020-06-18 RX ORDER — SODIUM CHLORIDE 0.9 % (FLUSH) 0.9 %
10 SYRINGE (ML) INJECTION PRN
Status: DISCONTINUED | OUTPATIENT
Start: 2020-06-18 | End: 2020-06-18 | Stop reason: HOSPADM

## 2020-06-18 RX ORDER — MORPHINE SULFATE 2 MG/ML
2 INJECTION, SOLUTION INTRAMUSCULAR; INTRAVENOUS EVERY 5 MIN PRN
Status: DISCONTINUED | OUTPATIENT
Start: 2020-06-18 | End: 2020-06-18 | Stop reason: HOSPADM

## 2020-06-18 RX ORDER — FENTANYL CITRATE 50 UG/ML
INJECTION, SOLUTION INTRAMUSCULAR; INTRAVENOUS
Status: COMPLETED
Start: 2020-06-18 | End: 2020-06-18

## 2020-06-18 RX ORDER — SODIUM CHLORIDE 0.9 % (FLUSH) 0.9 %
10 SYRINGE (ML) INJECTION PRN
Status: DISCONTINUED | OUTPATIENT
Start: 2020-06-18 | End: 2020-06-20 | Stop reason: HOSPADM

## 2020-06-18 RX ORDER — SODIUM CHLORIDE 9 MG/ML
INJECTION, SOLUTION INTRAVENOUS CONTINUOUS PRN
Status: DISCONTINUED | OUTPATIENT
Start: 2020-06-18 | End: 2020-06-18 | Stop reason: SDUPTHER

## 2020-06-18 RX ORDER — OXYCODONE HYDROCHLORIDE AND ACETAMINOPHEN 5; 325 MG/1; MG/1
1 TABLET ORAL PRN
Status: DISCONTINUED | OUTPATIENT
Start: 2020-06-18 | End: 2020-06-18 | Stop reason: HOSPADM

## 2020-06-18 RX ADMIN — PROPOFOL 200 MG: 10 INJECTION, EMULSION INTRAVENOUS at 11:17

## 2020-06-18 RX ADMIN — MIDAZOLAM HYDROCHLORIDE 2 MG: 2 INJECTION, SOLUTION INTRAMUSCULAR; INTRAVENOUS at 11:09

## 2020-06-18 RX ADMIN — ROCURONIUM BROMIDE 20 MG: 10 SOLUTION INTRAVENOUS at 13:07

## 2020-06-18 RX ADMIN — ONDANSETRON 4 MG: 2 INJECTION INTRAMUSCULAR; INTRAVENOUS at 11:28

## 2020-06-18 RX ADMIN — HYDROMORPHONE HYDROCHLORIDE 1 MG: 2 INJECTION INTRAMUSCULAR; INTRAVENOUS; SUBCUTANEOUS at 12:00

## 2020-06-18 RX ADMIN — FENTANYL CITRATE 50 MCG: 50 INJECTION, SOLUTION INTRAMUSCULAR; INTRAVENOUS at 09:55

## 2020-06-18 RX ADMIN — ONDANSETRON 4 MG: 2 INJECTION INTRAMUSCULAR; INTRAVENOUS at 22:22

## 2020-06-18 RX ADMIN — FENTANYL CITRATE 50 MCG: 50 INJECTION, SOLUTION INTRAMUSCULAR; INTRAVENOUS at 11:47

## 2020-06-18 RX ADMIN — SODIUM CHLORIDE, POTASSIUM CHLORIDE, SODIUM LACTATE AND CALCIUM CHLORIDE: 600; 310; 30; 20 INJECTION, SOLUTION INTRAVENOUS at 13:40

## 2020-06-18 RX ADMIN — KETOROLAC TROMETHAMINE 30 MG: 30 INJECTION, SOLUTION INTRAMUSCULAR at 19:15

## 2020-06-18 RX ADMIN — DEXAMETHASONE SODIUM PHOSPHATE 10 MG: 10 INJECTION INTRAMUSCULAR; INTRAVENOUS at 11:28

## 2020-06-18 RX ADMIN — FENTANYL CITRATE 100 MCG: 50 INJECTION, SOLUTION INTRAMUSCULAR; INTRAVENOUS at 11:11

## 2020-06-18 RX ADMIN — HYDROMORPHONE HYDROCHLORIDE 0.25 MG: 2 INJECTION, SOLUTION INTRAMUSCULAR; INTRAVENOUS; SUBCUTANEOUS at 23:50

## 2020-06-18 RX ADMIN — SODIUM CHLORIDE, POTASSIUM CHLORIDE, SODIUM LACTATE AND CALCIUM CHLORIDE: 600; 310; 30; 20 INJECTION, SOLUTION INTRAVENOUS at 11:57

## 2020-06-18 RX ADMIN — ROCURONIUM BROMIDE 50 MG: 10 SOLUTION INTRAVENOUS at 11:17

## 2020-06-18 RX ADMIN — HYDROMORPHONE HYDROCHLORIDE 1 MG: 2 INJECTION INTRAMUSCULAR; INTRAVENOUS; SUBCUTANEOUS at 13:41

## 2020-06-18 RX ADMIN — ONDANSETRON 4 MG: 2 INJECTION INTRAMUSCULAR; INTRAVENOUS at 17:18

## 2020-06-18 RX ADMIN — HYDROMORPHONE HYDROCHLORIDE 0.5 MG: 1 INJECTION, SOLUTION INTRAMUSCULAR; INTRAVENOUS; SUBCUTANEOUS at 16:17

## 2020-06-18 RX ADMIN — FENTANYL CITRATE 50 MCG: 50 INJECTION, SOLUTION INTRAMUSCULAR; INTRAVENOUS at 11:42

## 2020-06-18 RX ADMIN — CEFAZOLIN 2000 MG: 1 INJECTION, POWDER, FOR SOLUTION INTRAMUSCULAR; INTRAVENOUS at 13:58

## 2020-06-18 RX ADMIN — SODIUM CHLORIDE, POTASSIUM CHLORIDE, SODIUM LACTATE AND CALCIUM CHLORIDE: 600; 310; 30; 20 INJECTION, SOLUTION INTRAVENOUS at 09:56

## 2020-06-18 RX ADMIN — FENTANYL CITRATE 50 MCG: 50 INJECTION, SOLUTION INTRAMUSCULAR; INTRAVENOUS at 11:17

## 2020-06-18 RX ADMIN — SUGAMMADEX 200 MG: 100 INJECTION, SOLUTION INTRAVENOUS at 14:09

## 2020-06-18 RX ADMIN — DOCUSATE SODIUM 100 MG: 100 CAPSULE, LIQUID FILLED ORAL at 21:37

## 2020-06-18 RX ADMIN — ROCURONIUM BROMIDE 20 MG: 10 SOLUTION INTRAVENOUS at 11:59

## 2020-06-18 RX ADMIN — LIDOCAINE HYDROCHLORIDE 3 ML: 20 INJECTION, SOLUTION INFILTRATION; PERINEURAL at 11:17

## 2020-06-18 RX ADMIN — CEFAZOLIN SODIUM 2 G: 1 INJECTION, POWDER, FOR SOLUTION INTRAMUSCULAR; INTRAVENOUS at 11:11

## 2020-06-18 RX ADMIN — Medication 10 ML: at 21:40

## 2020-06-18 RX ADMIN — SODIUM CHLORIDE: 9 INJECTION, SOLUTION INTRAVENOUS at 11:23

## 2020-06-18 ASSESSMENT — PULMONARY FUNCTION TESTS
PIF_VALUE: 22
PIF_VALUE: 23
PIF_VALUE: 22
PIF_VALUE: 33
PIF_VALUE: 19
PIF_VALUE: 35
PIF_VALUE: 2
PIF_VALUE: 35
PIF_VALUE: 24
PIF_VALUE: 22
PIF_VALUE: 18
PIF_VALUE: 22
PIF_VALUE: 23
PIF_VALUE: 2
PIF_VALUE: 23
PIF_VALUE: 36
PIF_VALUE: 31
PIF_VALUE: 18
PIF_VALUE: 35
PIF_VALUE: 22
PIF_VALUE: 35
PIF_VALUE: 29
PIF_VALUE: 19
PIF_VALUE: 22
PIF_VALUE: 25
PIF_VALUE: 24
PIF_VALUE: 20
PIF_VALUE: 1
PIF_VALUE: 36
PIF_VALUE: 36
PIF_VALUE: 1
PIF_VALUE: 19
PIF_VALUE: 33
PIF_VALUE: 20
PIF_VALUE: 36
PIF_VALUE: 24
PIF_VALUE: 35
PIF_VALUE: 34
PIF_VALUE: 23
PIF_VALUE: 2
PIF_VALUE: 30
PIF_VALUE: 22
PIF_VALUE: 36
PIF_VALUE: 18
PIF_VALUE: 22
PIF_VALUE: 17
PIF_VALUE: 21
PIF_VALUE: 28
PIF_VALUE: 35
PIF_VALUE: 35
PIF_VALUE: 22
PIF_VALUE: 23
PIF_VALUE: 23
PIF_VALUE: 33
PIF_VALUE: 22
PIF_VALUE: 2
PIF_VALUE: 29
PIF_VALUE: 29
PIF_VALUE: 27
PIF_VALUE: 24
PIF_VALUE: 23
PIF_VALUE: 27
PIF_VALUE: 30
PIF_VALUE: 24
PIF_VALUE: 35
PIF_VALUE: 19
PIF_VALUE: 28
PIF_VALUE: 31
PIF_VALUE: 20
PIF_VALUE: 8
PIF_VALUE: 30
PIF_VALUE: 30
PIF_VALUE: 31
PIF_VALUE: 35
PIF_VALUE: 28
PIF_VALUE: 24
PIF_VALUE: 2
PIF_VALUE: 19
PIF_VALUE: 35
PIF_VALUE: 35
PIF_VALUE: 28
PIF_VALUE: 36
PIF_VALUE: 29
PIF_VALUE: 34
PIF_VALUE: 5
PIF_VALUE: 30
PIF_VALUE: 21
PIF_VALUE: 20
PIF_VALUE: 18
PIF_VALUE: 36
PIF_VALUE: 23
PIF_VALUE: 21
PIF_VALUE: 28
PIF_VALUE: 4
PIF_VALUE: 5
PIF_VALUE: 36
PIF_VALUE: 18
PIF_VALUE: 22
PIF_VALUE: 35
PIF_VALUE: 22
PIF_VALUE: 34
PIF_VALUE: 32
PIF_VALUE: 23
PIF_VALUE: 35
PIF_VALUE: 34
PIF_VALUE: 31
PIF_VALUE: 18
PIF_VALUE: 36
PIF_VALUE: 35
PIF_VALUE: 34
PIF_VALUE: 5
PIF_VALUE: 19
PIF_VALUE: 22
PIF_VALUE: 1
PIF_VALUE: 18
PIF_VALUE: 1
PIF_VALUE: 29
PIF_VALUE: 17
PIF_VALUE: 23
PIF_VALUE: 23
PIF_VALUE: 6
PIF_VALUE: 23
PIF_VALUE: 19
PIF_VALUE: 23
PIF_VALUE: 29
PIF_VALUE: 23
PIF_VALUE: 2
PIF_VALUE: 23
PIF_VALUE: 22
PIF_VALUE: 33
PIF_VALUE: 18
PIF_VALUE: 36
PIF_VALUE: 33
PIF_VALUE: 5
PIF_VALUE: 32
PIF_VALUE: 19
PIF_VALUE: 2
PIF_VALUE: 2
PIF_VALUE: 17
PIF_VALUE: 22
PIF_VALUE: 32
PIF_VALUE: 24
PIF_VALUE: 2
PIF_VALUE: 23
PIF_VALUE: 24
PIF_VALUE: 22
PIF_VALUE: 1
PIF_VALUE: 35
PIF_VALUE: 35
PIF_VALUE: 34
PIF_VALUE: 29
PIF_VALUE: 35
PIF_VALUE: 18
PIF_VALUE: 30
PIF_VALUE: 21
PIF_VALUE: 33
PIF_VALUE: 18
PIF_VALUE: 2
PIF_VALUE: 36
PIF_VALUE: 24
PIF_VALUE: 21
PIF_VALUE: 36
PIF_VALUE: 18
PIF_VALUE: 5
PIF_VALUE: 12
PIF_VALUE: 35
PIF_VALUE: 23
PIF_VALUE: 23
PIF_VALUE: 18
PIF_VALUE: 33
PIF_VALUE: 2
PIF_VALUE: 29
PIF_VALUE: 36
PIF_VALUE: 36
PIF_VALUE: 21
PIF_VALUE: 29
PIF_VALUE: 23
PIF_VALUE: 29
PIF_VALUE: 34
PIF_VALUE: 18
PIF_VALUE: 35
PIF_VALUE: 18
PIF_VALUE: 36

## 2020-06-18 ASSESSMENT — PAIN SCALES - GENERAL
PAINLEVEL_OUTOF10: 0
PAINLEVEL_OUTOF10: 6
PAINLEVEL_OUTOF10: 3
PAINLEVEL_OUTOF10: 2
PAINLEVEL_OUTOF10: 8
PAINLEVEL_OUTOF10: 7
PAINLEVEL_OUTOF10: 6

## 2020-06-18 ASSESSMENT — PAIN DESCRIPTION - PAIN TYPE
TYPE: SURGICAL PAIN
TYPE: ACUTE PAIN
TYPE: SURGICAL PAIN
TYPE: SURGICAL PAIN

## 2020-06-18 ASSESSMENT — PAIN SCALES - WONG BAKER: WONGBAKER_NUMERICALRESPONSE: 0

## 2020-06-18 ASSESSMENT — PAIN DESCRIPTION - LOCATION
LOCATION: ABDOMEN
LOCATION: HEAD
LOCATION: ABDOMEN
LOCATION: ABDOMEN;BACK

## 2020-06-18 ASSESSMENT — PAIN DESCRIPTION - DESCRIPTORS: DESCRIPTORS: SORE

## 2020-06-18 NOTE — PROGRESS NOTES
GYN Post Op    Subjective:  Kamila Covarrubias is a 53 y.o. female who is s/p FLACO BS. Cysto, POD #0    Patient is doing okay - somnolent after coming up from PACU. Pain is moderately controlled, dizzy from surgery. Tolerating clear liquid diet with some nausea, no vomiting. Has not attempted ambulation. Parisi in place, draining clear yellow fluid. Denies chest pain, shortness of breath, fever, chills, calf pain or tenderness. Review of Systems - The following ROS was otherwise negative, except as noted in the HPI: constitutional, respiratory, cardiovascular, gastrointestinal, genitourinary    Objective:  Vitals:    06/18/20 1656   BP: 120/75   Pulse: 87   Resp: 16   Temp: 98.5 °F (36.9 °C)   SpO2: 97%       General: Alert, well appearing, no acute distress  Heart: Regular rate   Lungs: Respirations unlabored  Abdomen: Soft, appropriately tender to palpation, non-distended, no rebound or guarding. Incisions with dressing in place, clean, dry and intact. No significant drainage or surrounding erythema. Extremities: No redness or tenderness in LE, neg Emmett's sign, SCDs in place bilaterally     UO: 1350mL     Lab Results:   H/H: 6.2/21.9 (on admission) > s/p 2 units packed RBCs intraop, 8.0/27.5 > 7.7/26.0     Assessment:    Kamila Covarrubias is a 53 y.o. female who is s/p FLACO BS. Cysto, POD #0     1. Severe anemia on admission, likely due to blood loss from AUB   2. Fibroid uterus   3. Grade 3 uterine prolapse   4. Grade 1 cystocele. 5. Grade 2 rectocele. Plan:   1. Doing fair, will work on pain control / nausea   2. Serial H/Hs due to significant anemia on admission prior to surgery, currently stable. VSS. May need additional unit of blood prior to discharge. 3. Once tolerating clears, advance diet as tolerated   4.  Plan for parisi out in am and attempted ambulation at that time     Disposition:   Likely DC to home POD #1 or 2 pending clinical course     If questions or concerns, please

## 2020-06-18 NOTE — ANESTHESIA POSTPROCEDURE EVALUATION
SpO2:95 %  06/18/20 1502, BP:(!) 117/58, Pulse:87, Resp:14, SpO2:95 %  06/18/20 1457, BP:(!) 105/42, Pulse:86, Resp:14, SpO2:96 %  06/18/20 1452, BP:(!) 109/57, Pulse:76, Resp:11, SpO2:94 %  06/18/20 1447, BP:(!) 110/58, Pulse:75, Resp:13, SpO2:92 %  06/18/20 1442, BP:(!) 117/59, Pulse:90, Resp:14, SpO2:91 %  06/18/20 1437, BP:122/65, Pulse:100, Resp:18, SpO2:90 %  06/18/20 1432, BP:89/67, Pulse:81, Resp:12, SpO2:92 %  06/18/20 1427, BP:(!) 106/48, Temp:97.7 °F (36.5 °C), Temp src:Temporal, Pulse:79, Resp:12, SpO2:95 %  06/18/20 0930, BP:(!) 140/78, Temp:97.9 °F (36.6 °C), Temp src:Oral, Resp:16, SpO2:100 %, Height:5' 6\" (1.676 m), Weight:178 lb (80.7 kg)     LABS:    CBC  Lab Results       Component                Value               Date/Time                  WBC                      7.3                 06/18/2020 09:42 AM        HGB                      8.0 (L)             06/18/2020 03:33 PM        HCT                      27.5 (L)            06/18/2020 03:33 PM        PLT                      532 (H)             06/18/2020 09:42 AM   RENAL  Lab Results       Component                Value               Date/Time                  NA                       139                 06/18/2020 09:42 AM        K                        3.9                 06/18/2020 09:42 AM        CL                       107                 06/18/2020 09:42 AM        CO2                      20 (L)              06/18/2020 09:42 AM        BUN                      7                   06/18/2020 09:42 AM        CREATININE               0.6                 06/18/2020 09:42 AM        GLUCOSE                  103 (H)             06/18/2020 09:42 AM   COAGS  No results found for: PROTIME, INR, APTT    Intake & Output:  @24HRIO@    Nausea & Vomiting:  No    Level of Consciousness:  Awake    Pain Assessment:  Adequate analgesia    Anesthesia Complications:  No apparent anesthetic complications    SUMMARY      Vital signs stable  OK to discharge

## 2020-06-18 NOTE — PROGRESS NOTES
Pt arrived to PACU, VSS. Pt restless, pulling off mask and O2. x3 lap dressings to abdomen c/d/i, ice applied. Small amount bloody drainage to pranav pad and pad beneath pt. Will continue to monitor.   Shiv Mascorro RN

## 2020-06-18 NOTE — PROGRESS NOTES
4 Eyes Skin Assessment     The patient is being assess for   Post-Op Surgical    I agree that 2 RN's have performed a thorough Head to Toe Skin Assessment on the patient. ALL assessment sites listed below have been assessed. Areas assessed by both nurses:   [x]   Head, Face, and Ears   [x]   Shoulders, Back, and Chest, Abdomen  [x]   Arms, Elbows, and Hands   [x]   Coccyx, Sacrum, and Ischium  [x]   Legs, Feet, and Heels        No new skin breakdown. X3 dressings to abdomen. Far right dressing has scant amount bloody drainage. **SHARE this note so that the co-signing nurse is able to place an eSignature**    Co-signer eSignature: {Esignature:[id number]}    Does the Patient have Skin Breakdown?   No          Carlos Prevention initiated:  No   Wound Care Orders initiated:  No      WOC nurse consulted for Pressure Injury (Stage 3,4, Unstageable, DTI, NWPT, Complex wounds)and New or Established Ostomies:  No      Primary Nurse eSignature: Electronically signed by Breanna Art RN on 6/18/20 at 4:20 PM EDT

## 2020-06-18 NOTE — PROGRESS NOTES
Pt c.o heache pain 8 out of 10. Notified anesthesia. New order received and administered. Will monitor.

## 2020-06-18 NOTE — H&P
Patient seen and evaluated prior to surgery. Patient reports no changes in medical condition. Does report hx of heavy bleeding with clots over the weekend. States it was an acute episode. She also noticed at the time her uterus was \"heavier in her vagina\". Daughter, an RN, did an exam and saw suspected uterine prolpase, but still within hymenal ring. Pt called answering service and was given bleeding precautions, instructed if bleeding persisted to go to emergency department. She said after uterus was \"pushed back in\" the bleeding improved and did not seek further attention. There have been no changes in the patient's medications or assessment. Preoperative tests and diagnostics have been reviewed. Severe anemia noted. To give 2 units of PRBCs. All have elected to proceed with surgery. Surgery remains indicated as noted in History and Physical (H&P). Prophylactic antibiotics, use of beta-blockers and VTE prophylaxis have been addressed/ordered as indicated. Please see H&P and orders.       Adeline Artis,

## 2020-06-18 NOTE — ANESTHESIA PRE PROCEDURE
[facility]) 4 MG/2ML injection                Allergies: Allergies   Allergen Reactions    Penicillins      Reaction unknown       Problem List:    Patient Active Problem List   Diagnosis Code    Intramural leiomyoma of uterus D25.1    Cystocele, midline N81.11    Pelvic pain R10.2    Abnormal uterine bleeding (AUB) N93.9       Past Medical History:        Diagnosis Date    Abnormal Pap smear of cervix     Abnormal uterine bleeding (AUB)     Menopausal symptoms     PONV (postoperative nausea and vomiting)     Urinary incontinence        Past Surgical History:        Procedure Laterality Date    TUBAL LIGATION      WISDOM TOOTH EXTRACTION         Social History:    Social History     Tobacco Use    Smoking status: Never Smoker    Smokeless tobacco: Never Used   Substance Use Topics    Alcohol use: Yes     Comment: socially                                Counseling given: Not Answered      Vital Signs (Current):   Vitals:    06/15/20 1050 06/18/20 0930   BP:  (!) 140/78   Resp:  16   Temp:  97.9 °F (36.6 °C)   TempSrc:  Oral   SpO2:  100%   Weight: 148 lb (67.1 kg) 178 lb (80.7 kg)   Height: 5' 6\" (1.676 m) 5' 6\" (1.676 m)                                              BP Readings from Last 3 Encounters:   06/18/20 (!) 140/78   06/15/20 134/86   06/10/20 136/82       NPO Status: Time of last liquid consumption: 0200                        Time of last solid consumption: 2000                        Date of last liquid consumption: 06/18/20                        Date of last solid food consumption: 06/16/20    BMI:   Wt Readings from Last 3 Encounters:   06/18/20 178 lb (80.7 kg)   06/15/20 148 lb (67.1 kg)   06/10/20 181 lb 3.2 oz (82.2 kg)     Body mass index is 28.73 kg/m².     CBC:   Lab Results   Component Value Date    WBC 7.3 06/18/2020    RBC 3.59 06/18/2020    HGB 6.2 06/18/2020    HCT 21.9 06/18/2020    MCV 60.9 06/18/2020    RDW 17.5 06/18/2020     06/18/2020       CMP:   Lab Results Maribell Francis MD   6/18/2020

## 2020-06-18 NOTE — OP NOTE
Operative Note    Patient: Claudette Nova  YOB: 1970  MRN: [id number]     Date of Procedure: 2020     Pre-Op Diagnosis: ABNORMAL UTERINE BLEEDING;PELVIC PAIN; URINARY INCONTINENCE, FIBROID UTERUS, ANEMIA ON ADMISSION     Post-Op Diagnosis: Same       Procedure(s):  LAPAROSCOPIC ASSISTED VAGINAL HYSTERECTOMY WITH BILATERAL SALPINGECTOMY AND VIDEO CYSTOSCOPY  CPT CODE - 00832, 94374, 70339, 44708     Surgeon(s):  DO Shanthi House DO     Assistant:  Surgical Assistant: Sugey Brambila RN     Anesthesia: General     Estimated Blood Loss (mL): 150mL      IVF: 3000mL     UO: 400mL clear urine s/p procedure      2 units Packed RBCs     Complications: None     Specimens:   ID Type Source Tests Collected by Time Destination   A : UTERUS, CERVIX, BILATERAL FALLOPIAN TUBES AND CYST Tissue Tissue SURGICAL PATHOLOGY Britta Palma DO 2020 1329        Implants:  * No implants in log *      Drains:   Urethral Catheter Non-latex 16 fr (Active)      Findings:   1) Norm appearing urethra, external genitalia, vagina. Grade 3 uterine prolapse. Grade 1 cystocele. Grade 2-3 rectocele. 2) normal appearing bowel, liver edge, bilateral ovaries. 3) course of ureters noted through pelvis   4) normal left tube  5) hydrosalpinx to right tube   6) enlarged fibroid uterus   7) normal irma bladder mucosa. Efflux of urine from bilateral uterus. Normal irma intact dome and trigone. Indications:   Pt is a 52 yo  who presents to office c/o AUB, pelvic pain. US suggestive of uterine fibroids. Pt elected to proceed with LAVH BS, possible Cysto, possible Ant Repair, possible Post repair. RBA reviewed. Consents signed in office. Description of procedure: The patient was taken to the operating room where general anesthesia was administered and found to be adequate. The patient was placed in the dorsal lithotomy position with yellow fin stirrups.   The patient was prepped and draped in the normal sterile fashion. A universal time out was performed and all parties agreed to accurate information. 2g Ancef given prior to incision. 2 units of packed RBCs prepped and given intraoperatively. Attention was turned to genitalia. An indwelling parisi cathter was inserted, bladder was drained and bag was fastened to the leg. A weighted speculum was then placed in the posterior fornix of the vagina, exposing the anterior lip of the cervix, which was grasped with a single tooth tenaculum. Humi uterine manipulator was then placed within cervix to uterine fundus for aided manipulation during procedure. Speculum and tenaculum was removed. Legs were brought into low lithotomy position and gloves were changed. Attention was then turned to the abdomen. 0.5% Marcaine with epinephrine was injected in the infraumbilical fold. A vertical skin incision was created using a scalpel. A 5mm bladeless trochar was placed  into the abdomen under direct laparoscopic visualization without difficulty. Pneumoperitoneum was obtained with CO2 gas. Inspection beneath trochar insertion site was noted to be free of trauma. A second 5mm trochar was placed in the left lower quadrant under direct laparoscopic visualization without difficulty. A third 5mm trochar was placed in the right lower quadrant under direct laparoscopic visualization without difficulty. Primary survey of the abdomen was performed and findings were noted above. Path of bilateral ureters noted prior to start of procedure. Attention was turned to the patient's left adnexa. Fallopian tube was followed to the fimbriated end and grasped with an atraumatic grasper. Tube was amputated from the mesosalpinx and transected at isthmic portion. Tube was then removed from abdomen. The left cornu was grasped and elevated, the round ligament was then transected with Ligasure Device, opening the anterior broad ligament.  Bladder flap was then created

## 2020-06-19 PROBLEM — Z90.710 S/P LAPAROSCOPIC ASSISTED VAGINAL HYSTERECTOMY (LAVH): Status: ACTIVE | Noted: 2020-06-19

## 2020-06-19 PROBLEM — D64.9 SEVERE ANEMIA: Status: ACTIVE | Noted: 2020-06-19

## 2020-06-19 LAB
BLOOD BANK DISPENSE STATUS: NORMAL
BLOOD BANK PRODUCT CODE: NORMAL
BPU ID: NORMAL
DESCRIPTION BLOOD BANK: NORMAL
HCT VFR BLD CALC: 25.1 % (ref 36–48)
HCT VFR BLD CALC: 26 % (ref 36–48)
HEMOGLOBIN: 7.3 G/DL (ref 12–16)
HEMOGLOBIN: 7.6 G/DL (ref 12–16)

## 2020-06-19 PROCEDURE — 96374 THER/PROPH/DIAG INJ IV PUSH: CPT

## 2020-06-19 PROCEDURE — 6360000002 HC RX W HCPCS: Performed by: OBSTETRICS & GYNECOLOGY

## 2020-06-19 PROCEDURE — 96376 TX/PRO/DX INJ SAME DRUG ADON: CPT

## 2020-06-19 PROCEDURE — 36415 COLL VENOUS BLD VENIPUNCTURE: CPT

## 2020-06-19 PROCEDURE — 96375 TX/PRO/DX INJ NEW DRUG ADDON: CPT

## 2020-06-19 PROCEDURE — 6370000000 HC RX 637 (ALT 250 FOR IP): Performed by: OBSTETRICS & GYNECOLOGY

## 2020-06-19 PROCEDURE — G0378 HOSPITAL OBSERVATION PER HR: HCPCS

## 2020-06-19 PROCEDURE — 2500000003 HC RX 250 WO HCPCS: Performed by: OBSTETRICS & GYNECOLOGY

## 2020-06-19 PROCEDURE — 2580000003 HC RX 258: Performed by: OBSTETRICS & GYNECOLOGY

## 2020-06-19 PROCEDURE — 36430 TRANSFUSION BLD/BLD COMPNT: CPT

## 2020-06-19 PROCEDURE — 85014 HEMATOCRIT: CPT

## 2020-06-19 PROCEDURE — P9016 RBC LEUKOCYTES REDUCED: HCPCS

## 2020-06-19 PROCEDURE — 85018 HEMOGLOBIN: CPT

## 2020-06-19 RX ORDER — PSEUDOEPHEDRINE HCL 30 MG
100 TABLET ORAL 2 TIMES DAILY
Qty: 60 CAPSULE | Refills: 3 | Status: SHIPPED | OUTPATIENT
Start: 2020-06-19

## 2020-06-19 RX ORDER — ONDANSETRON 4 MG/1
4 TABLET, FILM COATED ORAL EVERY 8 HOURS PRN
Qty: 30 TABLET | Refills: 0 | Status: ON HOLD | OUTPATIENT
Start: 2020-06-19 | End: 2021-11-30 | Stop reason: HOSPADM

## 2020-06-19 RX ORDER — OXYCODONE HYDROCHLORIDE AND ACETAMINOPHEN 5; 325 MG/1; MG/1
1-2 TABLET ORAL EVERY 4 HOURS PRN
Qty: 28 TABLET | Refills: 0 | Status: SHIPPED | OUTPATIENT
Start: 2020-06-19 | End: 2020-06-26

## 2020-06-19 RX ORDER — 0.9 % SODIUM CHLORIDE 0.9 %
20 INTRAVENOUS SOLUTION INTRAVENOUS ONCE
Status: DISCONTINUED | OUTPATIENT
Start: 2020-06-19 | End: 2020-06-20 | Stop reason: HOSPADM

## 2020-06-19 RX ORDER — IBUPROFEN 800 MG/1
800 TABLET ORAL EVERY 8 HOURS PRN
Qty: 120 TABLET | Refills: 0 | Status: SHIPPED | OUTPATIENT
Start: 2020-06-19

## 2020-06-19 RX ORDER — FERROUS SULFATE 325(65) MG
325 TABLET ORAL 2 TIMES DAILY
Qty: 180 TABLET | Refills: 1 | Status: SHIPPED | OUTPATIENT
Start: 2020-06-19

## 2020-06-19 RX ADMIN — DOCUSATE SODIUM 100 MG: 100 CAPSULE, LIQUID FILLED ORAL at 09:56

## 2020-06-19 RX ADMIN — KETOROLAC TROMETHAMINE 30 MG: 30 INJECTION, SOLUTION INTRAMUSCULAR at 07:07

## 2020-06-19 RX ADMIN — Medication 10 ML: at 09:56

## 2020-06-19 RX ADMIN — IBUPROFEN 800 MG: 400 TABLET, FILM COATED ORAL at 17:34

## 2020-06-19 RX ADMIN — ACETAMINOPHEN 650 MG: 325 TABLET ORAL at 22:25

## 2020-06-19 RX ADMIN — SODIUM CHLORIDE 250 ML: 9 INJECTION, SOLUTION INTRAVENOUS at 17:00

## 2020-06-19 RX ADMIN — IBUPROFEN 800 MG: 400 TABLET, FILM COATED ORAL at 13:07

## 2020-06-19 RX ADMIN — Medication 10 ML: at 22:13

## 2020-06-19 RX ADMIN — KETOROLAC TROMETHAMINE 30 MG: 30 INJECTION, SOLUTION INTRAMUSCULAR at 02:07

## 2020-06-19 RX ADMIN — SILVER SULFADIAZINE: 10 CREAM TOPICAL at 09:57

## 2020-06-19 RX ADMIN — ONDANSETRON 4 MG: 2 INJECTION INTRAMUSCULAR; INTRAVENOUS at 07:06

## 2020-06-19 ASSESSMENT — PAIN SCALES - GENERAL
PAINLEVEL_OUTOF10: 3
PAINLEVEL_OUTOF10: 4
PAINLEVEL_OUTOF10: 5
PAINLEVEL_OUTOF10: 2

## 2020-06-19 NOTE — PLAN OF CARE
Problem: Pain:  Goal: Pain level will decrease  Description: Pain level will decrease  Outcome: Ongoing  Goal: Control of acute pain  Description: Control of acute pain  Outcome: Ongoing  Goal: Control of chronic pain  Description: Control of chronic pain  Outcome: Ongoing  Goal: Patient's pain/discomfort is manageable  Description: Patient's pain/discomfort is manageable  Outcome: Ongoing     Problem: Infection:  Goal: Will remain free from infection  Description: Will remain free from infection  Outcome: Ongoing     Problem: Safety:  Goal: Free from accidental physical injury  Description: Free from accidental physical injury  Outcome: Ongoing  Goal: Free from intentional harm  Description: Free from intentional harm  Outcome: Ongoing     Problem: Daily Care:  Goal: Daily care needs are met  Description: Daily care needs are met  Outcome: Ongoing     Problem: Skin Integrity:  Goal: Skin integrity will stabilize  Description: Skin integrity will stabilize  Outcome: Ongoing     Problem: Discharge Planning:  Goal: Patients continuum of care needs are met  Description: Patients continuum of care needs are met  Outcome: Ongoing

## 2020-06-19 NOTE — PLAN OF CARE
Problem: Skin Integrity:  Intervention: Turn patient  Flowsheets (Taken 6/19/2020 2153)  Repositioned:   Turns self   Semi fowlers  Note: Pt repositioning self independently

## 2020-06-20 VITALS
TEMPERATURE: 98.1 F | WEIGHT: 178 LBS | BODY MASS INDEX: 28.61 KG/M2 | SYSTOLIC BLOOD PRESSURE: 130 MMHG | DIASTOLIC BLOOD PRESSURE: 75 MMHG | RESPIRATION RATE: 16 BRPM | HEART RATE: 78 BPM | HEIGHT: 66 IN | OXYGEN SATURATION: 96 %

## 2020-06-20 LAB
HCT VFR BLD CALC: 28.5 % (ref 36–48)
HEMOGLOBIN: 8.5 G/DL (ref 12–16)

## 2020-06-20 PROCEDURE — 2580000003 HC RX 258: Performed by: OBSTETRICS & GYNECOLOGY

## 2020-06-20 PROCEDURE — G0378 HOSPITAL OBSERVATION PER HR: HCPCS

## 2020-06-20 PROCEDURE — 85018 HEMOGLOBIN: CPT

## 2020-06-20 PROCEDURE — 85014 HEMATOCRIT: CPT

## 2020-06-20 PROCEDURE — 6370000000 HC RX 637 (ALT 250 FOR IP): Performed by: OBSTETRICS & GYNECOLOGY

## 2020-06-20 RX ADMIN — ACETAMINOPHEN 650 MG: 325 TABLET ORAL at 06:01

## 2020-06-20 RX ADMIN — SILVER SULFADIAZINE: 10 CREAM TOPICAL at 10:02

## 2020-06-20 RX ADMIN — Medication 10 ML: at 08:32

## 2020-06-20 RX ADMIN — IBUPROFEN 800 MG: 400 TABLET, FILM COATED ORAL at 02:00

## 2020-06-20 RX ADMIN — IBUPROFEN 800 MG: 400 TABLET, FILM COATED ORAL at 08:45

## 2020-06-20 ASSESSMENT — PAIN SCALES - GENERAL
PAINLEVEL_OUTOF10: 2
PAINLEVEL_OUTOF10: 3
PAINLEVEL_OUTOF10: 3

## 2020-06-20 NOTE — PLAN OF CARE
Problem: Pain:  Goal: Pain level will decrease  Description: Pain level will decrease  6/20/2020 0025 by Jacque Jimenez RN  Outcome: Ongoing  6/19/2020 1505 by Mary Ann Alejandre RN  Outcome: Ongoing  Goal: Control of acute pain  Description: Control of acute pain  6/20/2020 0025 by Jacque Jimenez RN  Outcome: Ongoing  6/19/2020 1505 by Mary Ann Alejandre RN  Outcome: Ongoing  Goal: Control of chronic pain  Description: Control of chronic pain  6/20/2020 0025 by Jacque Jimenez RN  Outcome: Ongoing  6/19/2020 1505 by Mary Ann Alejandre RN  Outcome: Ongoing  Goal: Patient's pain/discomfort is manageable  Description: Patient's pain/discomfort is manageable  6/20/2020 0025 by Jacque Jimenez RN  Outcome: Ongoing  6/19/2020 1505 by Mary Ann Alejandre RN  Outcome: Ongoing     Problem: Infection:  Goal: Will remain free from infection  Description: Will remain free from infection  6/20/2020 0025 by Jacque Jimenez RN  Outcome: Ongoing  6/19/2020 1505 by Mary Ann Alejandre RN  Outcome: Ongoing     Problem: Safety:  Goal: Free from accidental physical injury  Description: Free from accidental physical injury  6/20/2020 0025 by Jacque Jimenez RN  Outcome: Ongoing  6/19/2020 1505 by Mary Ann Alejandre RN  Outcome: Ongoing  Goal: Free from intentional harm  Description: Free from intentional harm  6/20/2020 0025 by Jacque Jimenez RN  Outcome: Ongoing  6/19/2020 1505 by Mary Ann Alejandre RN  Outcome: Ongoing     Problem: Daily Care:  Goal: Daily care needs are met  Description: Daily care needs are met  6/20/2020 0025 by Jacque Jimenez RN  Outcome: Ongoing  6/19/2020 1505 by Mary Ann Alejandre RN  Outcome: Ongoing     Problem: Discharge Planning:  Goal: Patients continuum of care needs are met  Description: Patients continuum of care needs are met  6/20/2020 0025 by Jacque Jimenez RN  Outcome: Ongoing  6/19/2020 1505 by Mary Ann Alejandre RN  Outcome: Ongoing     Problem: Falls - Risk of:  Goal: Will remain free from falls  Description: Will remain free

## 2020-06-20 NOTE — PROGRESS NOTES
Pt alert and oriented, VSS. Shift assessment completed and documented. Pt tolerating diet. Passing gas, and had a bowel movement last night. Pain is well controlled with ibuprofen/tylenol. Feels better after getting blood yesterday. Denies any needs at this time. Bed locked and in lowest position, call light within reach. Will continue to monitor.

## 2020-06-23 ENCOUNTER — TELEPHONE (OUTPATIENT)
Dept: FAMILY MEDICINE CLINIC | Age: 50
End: 2020-06-23

## 2020-06-24 ENCOUNTER — TELEPHONE (OUTPATIENT)
Dept: OBGYN CLINIC | Age: 50
End: 2020-06-24

## 2020-06-25 ENCOUNTER — OFFICE VISIT (OUTPATIENT)
Dept: OBGYN CLINIC | Age: 50
End: 2020-06-25

## 2020-06-25 VITALS
TEMPERATURE: 98.8 F | WEIGHT: 180.4 LBS | HEART RATE: 84 BPM | BODY MASS INDEX: 29.12 KG/M2 | DIASTOLIC BLOOD PRESSURE: 68 MMHG | SYSTOLIC BLOOD PRESSURE: 128 MMHG

## 2020-06-25 PROCEDURE — 99024 POSTOP FOLLOW-UP VISIT: CPT | Performed by: OBSTETRICS & GYNECOLOGY

## 2020-07-21 ENCOUNTER — OFFICE VISIT (OUTPATIENT)
Dept: OBGYN CLINIC | Age: 50
End: 2020-07-21

## 2020-07-21 VITALS
TEMPERATURE: 98.4 F | SYSTOLIC BLOOD PRESSURE: 132 MMHG | WEIGHT: 175 LBS | DIASTOLIC BLOOD PRESSURE: 80 MMHG | BODY MASS INDEX: 28.12 KG/M2 | HEART RATE: 85 BPM | HEIGHT: 66 IN

## 2020-07-21 PROCEDURE — 99024 POSTOP FOLLOW-UP VISIT: CPT | Performed by: OBSTETRICS & GYNECOLOGY

## 2020-07-21 NOTE — PROGRESS NOTES
Outpatient Postoperative Visit     CC:   Chief Complaint   Patient presents with    Post-Op Check       Subjective:  53 y.o.  here for evaluation s/p LAVH BS, Cystoscopy on 20 for AUB, Fibroids and anemia. Pt seen and examined. Doing well. No concerns complaints. Pain well controlled, using tylenol / ibuprofen for pain control. +Flatus. Denies vaginal spotting. Denies dysuria/ hematuria - in fact states urination has improved since surgery. Does admit to some dizziness yesterday that resolved with rest.     Review of Systems - The following ROS was otherwise negative, except as noted in the HPI: constitutional, respiratory, cardiovascular, gastrointestinal, genitourinary    Objective:  /80 (Site: Left Upper Arm, Position: Sitting)   Pulse 85   Temp 98.4 °F (36.9 °C) (Temporal)   Ht 5' 6\" (1.676 m)   Wt 175 lb (79.4 kg)   BMI 28.25 kg/m²   General: Alert, well appearing, no acute distress  Abdomen: Soft, appropriately tender to palpation, non-distended Incision: Appears c/d/i, no significant drainage or erythema. Residual suture removed at bedside. Pelvic exam: VULVA: normal appearing vulva with no masses, tenderness or lesions, VAGINA: normal appearing vagina with normal color and discharge, no lesions, CERVIX: surgically absent, UTERUS: surgically absent, vaginal cuff well healed, ADNEXA: normal adnexa in size, nontender and no masses, exam chaperoned by female MA. Extremities: No redness or tenderness in LE, neg Emmett's sign     Path:   FINAL DIAGNOSIS:    Hysterectomy, bilateral salpingectomy:       - Enlarged 341 gram uterus with:     - Proliferative endometrium; no polyp, hyperplasia or atypia.     - Multiple (0.3 cm to 4.7 cm) benign leiomyomas.     - Mild chronic cervicitis.    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     -   PHIAB/PHIAB    Assessment/Plan:   Diagnosis Orders   1.  S/P laparoscopic assisted vaginal hysterectomy (LAVH)     2. Severe anemia     3. Intramural leiomyoma of uterus       - doing well  - pathology and return precautions reviewed     Follow Up  Return in about 1 year (around 7/21/2021) for Annual or sooner if needed.     Apoorva Fernández,

## 2021-05-04 ENCOUNTER — TELEPHONE (OUTPATIENT)
Dept: FAMILY MEDICINE CLINIC | Age: 51
End: 2021-05-04

## 2021-11-27 ENCOUNTER — HOSPITAL ENCOUNTER (INPATIENT)
Age: 51
LOS: 3 days | Discharge: HOME | DRG: 872 | End: 2021-11-30
Attending: EMERGENCY MEDICINE | Admitting: INTERNAL MEDICINE
Payer: COMMERCIAL

## 2021-11-27 DIAGNOSIS — Z78.9 FAILURE OF OUTPATIENT TREATMENT: ICD-10-CM

## 2021-11-27 DIAGNOSIS — L03.317 CELLULITIS OF BUTTOCK: Primary | ICD-10-CM

## 2021-11-27 PROBLEM — L02.31 GLUTEAL ABSCESS: Status: ACTIVE | Noted: 2021-11-27

## 2021-11-27 LAB
A/G RATIO: 1.4 (ref 1.1–2.2)
ALBUMIN SERPL-MCNC: 3.9 G/DL (ref 3.4–5)
ALP BLD-CCNC: 75 U/L (ref 40–129)
ALT SERPL-CCNC: 14 U/L (ref 10–40)
ANION GAP SERPL CALCULATED.3IONS-SCNC: 13 MMOL/L (ref 3–16)
AST SERPL-CCNC: 12 U/L (ref 15–37)
BACTERIA: ABNORMAL /HPF
BASOPHILS ABSOLUTE: 0.1 K/UL (ref 0–0.2)
BASOPHILS RELATIVE PERCENT: 0.4 %
BILIRUB SERPL-MCNC: 0.3 MG/DL (ref 0–1)
BILIRUBIN URINE: NEGATIVE
BLOOD, URINE: ABNORMAL
BUN BLDV-MCNC: 7 MG/DL (ref 7–20)
CALCIUM SERPL-MCNC: 9 MG/DL (ref 8.3–10.6)
CHLORIDE BLD-SCNC: 100 MMOL/L (ref 99–110)
CLARITY: CLEAR
CO2: 23 MMOL/L (ref 21–32)
COLOR: YELLOW
CREAT SERPL-MCNC: 0.8 MG/DL (ref 0.6–1.1)
EOSINOPHILS ABSOLUTE: 0.1 K/UL (ref 0–0.6)
EOSINOPHILS RELATIVE PERCENT: 0.4 %
EPITHELIAL CELLS, UA: ABNORMAL /HPF (ref 0–5)
GFR AFRICAN AMERICAN: >60
GFR NON-AFRICAN AMERICAN: >60
GLUCOSE BLD-MCNC: 175 MG/DL (ref 70–99)
GLUCOSE URINE: NEGATIVE MG/DL
HCT VFR BLD CALC: 49.8 % (ref 36–48)
HEMOGLOBIN: 16 G/DL (ref 12–16)
KETONES, URINE: NEGATIVE MG/DL
LACTIC ACID, SEPSIS: 1.3 MMOL/L (ref 0.4–1.9)
LEUKOCYTE ESTERASE, URINE: NEGATIVE
LYMPHOCYTES ABSOLUTE: 1 K/UL (ref 1–5.1)
LYMPHOCYTES RELATIVE PERCENT: 6.5 %
MCH RBC QN AUTO: 30.4 PG (ref 26–34)
MCHC RBC AUTO-ENTMCNC: 32.1 G/DL (ref 31–36)
MCV RBC AUTO: 94.6 FL (ref 80–100)
MICROSCOPIC EXAMINATION: YES
MONOCYTES ABSOLUTE: 1.1 K/UL (ref 0–1.3)
MONOCYTES RELATIVE PERCENT: 7.1 %
NEUTROPHILS ABSOLUTE: 13.2 K/UL (ref 1.7–7.7)
NEUTROPHILS RELATIVE PERCENT: 85.6 %
NITRITE, URINE: NEGATIVE
PDW BLD-RTO: 12.9 % (ref 12.4–15.4)
PH UA: 6.5 (ref 5–8)
PLATELET # BLD: 233 K/UL (ref 135–450)
PMV BLD AUTO: 8.7 FL (ref 5–10.5)
POTASSIUM REFLEX MAGNESIUM: 3.8 MMOL/L (ref 3.5–5.1)
PROCALCITONIN: 0.05 NG/ML (ref 0–0.15)
PROTEIN UA: NEGATIVE MG/DL
RBC # BLD: 5.27 M/UL (ref 4–5.2)
RBC UA: ABNORMAL /HPF (ref 0–4)
SARS-COV-2, NAAT: NOT DETECTED
SODIUM BLD-SCNC: 136 MMOL/L (ref 136–145)
SPECIFIC GRAVITY UA: <=1.005 (ref 1–1.03)
TOTAL PROTEIN: 6.7 G/DL (ref 6.4–8.2)
URINE TYPE: ABNORMAL
UROBILINOGEN, URINE: 0.2 E.U./DL
WBC # BLD: 15.4 K/UL (ref 4–11)
WBC UA: ABNORMAL /HPF (ref 0–5)

## 2021-11-27 PROCEDURE — 96367 TX/PROPH/DG ADDL SEQ IV INF: CPT

## 2021-11-27 PROCEDURE — 6370000000 HC RX 637 (ALT 250 FOR IP): Performed by: NURSE PRACTITIONER

## 2021-11-27 PROCEDURE — 99203 OFFICE O/P NEW LOW 30 MIN: CPT | Performed by: SURGERY

## 2021-11-27 PROCEDURE — 80053 COMPREHEN METABOLIC PANEL: CPT

## 2021-11-27 PROCEDURE — 6370000000 HC RX 637 (ALT 250 FOR IP): Performed by: INTERNAL MEDICINE

## 2021-11-27 PROCEDURE — 0J990ZZ DRAINAGE OF BUTTOCK SUBCUTANEOUS TISSUE AND FASCIA, OPEN APPROACH: ICD-10-PCS | Performed by: SURGERY

## 2021-11-27 PROCEDURE — 87077 CULTURE AEROBIC IDENTIFY: CPT

## 2021-11-27 PROCEDURE — 87635 SARS-COV-2 COVID-19 AMP PRB: CPT

## 2021-11-27 PROCEDURE — 10061 I&D ABSCESS COMP/MULTIPLE: CPT | Performed by: SURGERY

## 2021-11-27 PROCEDURE — 87070 CULTURE OTHR SPECIMN AEROBIC: CPT

## 2021-11-27 PROCEDURE — 87040 BLOOD CULTURE FOR BACTERIA: CPT

## 2021-11-27 PROCEDURE — 84145 PROCALCITONIN (PCT): CPT

## 2021-11-27 PROCEDURE — 96375 TX/PRO/DX INJ NEW DRUG ADDON: CPT

## 2021-11-27 PROCEDURE — 87205 SMEAR GRAM STAIN: CPT

## 2021-11-27 PROCEDURE — 87186 SC STD MICRODIL/AGAR DIL: CPT

## 2021-11-27 PROCEDURE — 1200000000 HC SEMI PRIVATE

## 2021-11-27 PROCEDURE — 6360000002 HC RX W HCPCS: Performed by: INTERNAL MEDICINE

## 2021-11-27 PROCEDURE — 96365 THER/PROPH/DIAG IV INF INIT: CPT

## 2021-11-27 PROCEDURE — 83605 ASSAY OF LACTIC ACID: CPT

## 2021-11-27 PROCEDURE — G0378 HOSPITAL OBSERVATION PER HR: HCPCS

## 2021-11-27 PROCEDURE — 86403 PARTICLE AGGLUT ANTBDY SCRN: CPT

## 2021-11-27 PROCEDURE — 99285 EMERGENCY DEPT VISIT HI MDM: CPT

## 2021-11-27 PROCEDURE — 96372 THER/PROPH/DIAG INJ SC/IM: CPT

## 2021-11-27 PROCEDURE — 85025 COMPLETE CBC W/AUTO DIFF WBC: CPT

## 2021-11-27 PROCEDURE — 2580000003 HC RX 258: Performed by: INTERNAL MEDICINE

## 2021-11-27 PROCEDURE — 81001 URINALYSIS AUTO W/SCOPE: CPT

## 2021-11-27 PROCEDURE — 2580000003 HC RX 258: Performed by: NURSE PRACTITIONER

## 2021-11-27 PROCEDURE — 6360000002 HC RX W HCPCS: Performed by: NURSE PRACTITIONER

## 2021-11-27 RX ORDER — KETOROLAC TROMETHAMINE 30 MG/ML
15 INJECTION, SOLUTION INTRAMUSCULAR; INTRAVENOUS ONCE
Status: COMPLETED | OUTPATIENT
Start: 2021-11-27 | End: 2021-11-27

## 2021-11-27 RX ORDER — SODIUM CHLORIDE, SODIUM LACTATE, POTASSIUM CHLORIDE, AND CALCIUM CHLORIDE .6; .31; .03; .02 G/100ML; G/100ML; G/100ML; G/100ML
30 INJECTION, SOLUTION INTRAVENOUS ONCE
Status: COMPLETED | OUTPATIENT
Start: 2021-11-27 | End: 2021-11-27

## 2021-11-27 RX ORDER — MORPHINE SULFATE 4 MG/ML
4 INJECTION, SOLUTION INTRAMUSCULAR; INTRAVENOUS ONCE
Status: DISCONTINUED | OUTPATIENT
Start: 2021-11-27 | End: 2021-11-27

## 2021-11-27 RX ORDER — SODIUM CHLORIDE 0.9 % (FLUSH) 0.9 %
5-40 SYRINGE (ML) INJECTION EVERY 12 HOURS SCHEDULED
Status: DISCONTINUED | OUTPATIENT
Start: 2021-11-27 | End: 2021-11-27

## 2021-11-27 RX ORDER — DOCUSATE SODIUM 100 MG/1
100 CAPSULE, LIQUID FILLED ORAL 2 TIMES DAILY
Status: DISCONTINUED | OUTPATIENT
Start: 2021-11-27 | End: 2021-11-30 | Stop reason: HOSPADM

## 2021-11-27 RX ORDER — SODIUM CHLORIDE 0.9 % (FLUSH) 0.9 %
5-40 SYRINGE (ML) INJECTION PRN
Status: DISCONTINUED | OUTPATIENT
Start: 2021-11-27 | End: 2021-11-30 | Stop reason: HOSPADM

## 2021-11-27 RX ORDER — ONDANSETRON 2 MG/ML
4 INJECTION INTRAMUSCULAR; INTRAVENOUS ONCE
Status: COMPLETED | OUTPATIENT
Start: 2021-11-27 | End: 2021-11-27

## 2021-11-27 RX ORDER — ACETAMINOPHEN 325 MG/1
650 TABLET ORAL EVERY 6 HOURS PRN
Status: DISCONTINUED | OUTPATIENT
Start: 2021-11-27 | End: 2021-11-29

## 2021-11-27 RX ORDER — SODIUM CHLORIDE 0.9 % (FLUSH) 0.9 %
5-40 SYRINGE (ML) INJECTION EVERY 12 HOURS SCHEDULED
Status: DISCONTINUED | OUTPATIENT
Start: 2021-11-27 | End: 2021-11-30 | Stop reason: HOSPADM

## 2021-11-27 RX ORDER — SODIUM CHLORIDE 9 MG/ML
25 INJECTION, SOLUTION INTRAVENOUS PRN
Status: DISCONTINUED | OUTPATIENT
Start: 2021-11-27 | End: 2021-11-27

## 2021-11-27 RX ORDER — SODIUM CHLORIDE 0.9 % (FLUSH) 0.9 %
5-40 SYRINGE (ML) INJECTION PRN
Status: DISCONTINUED | OUTPATIENT
Start: 2021-11-27 | End: 2021-11-27

## 2021-11-27 RX ORDER — SODIUM CHLORIDE 9 MG/ML
25 INJECTION, SOLUTION INTRAVENOUS PRN
Status: DISCONTINUED | OUTPATIENT
Start: 2021-11-27 | End: 2021-11-30 | Stop reason: HOSPADM

## 2021-11-27 RX ORDER — ACETAMINOPHEN 650 MG/1
650 SUPPOSITORY RECTAL EVERY 6 HOURS PRN
Status: DISCONTINUED | OUTPATIENT
Start: 2021-11-27 | End: 2021-11-29

## 2021-11-27 RX ORDER — IBUPROFEN 800 MG/1
800 TABLET ORAL EVERY 8 HOURS PRN
Status: DISCONTINUED | OUTPATIENT
Start: 2021-11-27 | End: 2021-11-27 | Stop reason: ALTCHOICE

## 2021-11-27 RX ORDER — POLYETHYLENE GLYCOL 3350 17 G/17G
17 POWDER, FOR SOLUTION ORAL DAILY PRN
Status: DISCONTINUED | OUTPATIENT
Start: 2021-11-27 | End: 2021-11-30 | Stop reason: HOSPADM

## 2021-11-27 RX ORDER — ONDANSETRON 4 MG/1
4 TABLET, ORALLY DISINTEGRATING ORAL EVERY 8 HOURS PRN
Status: DISCONTINUED | OUTPATIENT
Start: 2021-11-27 | End: 2021-11-30 | Stop reason: HOSPADM

## 2021-11-27 RX ORDER — ACETAMINOPHEN 325 MG/1
650 TABLET ORAL ONCE
Status: COMPLETED | OUTPATIENT
Start: 2021-11-27 | End: 2021-11-27

## 2021-11-27 RX ORDER — ONDANSETRON 2 MG/ML
4 INJECTION INTRAMUSCULAR; INTRAVENOUS EVERY 6 HOURS PRN
Status: DISCONTINUED | OUTPATIENT
Start: 2021-11-27 | End: 2021-11-30 | Stop reason: HOSPADM

## 2021-11-27 RX ORDER — FERROUS SULFATE 325(65) MG
325 TABLET ORAL 2 TIMES DAILY
Status: DISCONTINUED | OUTPATIENT
Start: 2021-11-27 | End: 2021-11-30 | Stop reason: HOSPADM

## 2021-11-27 RX ORDER — SODIUM CHLORIDE 9 MG/ML
INJECTION, SOLUTION INTRAVENOUS CONTINUOUS
Status: DISCONTINUED | OUTPATIENT
Start: 2021-11-27 | End: 2021-11-30 | Stop reason: HOSPADM

## 2021-11-27 RX ADMIN — SODIUM CHLORIDE 1000 ML: 9 INJECTION, SOLUTION INTRAVENOUS at 19:16

## 2021-11-27 RX ADMIN — DOCUSATE SODIUM 100 MG: 100 CAPSULE ORAL at 21:34

## 2021-11-27 RX ADMIN — KETOROLAC TROMETHAMINE 15 MG: 30 INJECTION, SOLUTION INTRAMUSCULAR; INTRAVENOUS at 15:50

## 2021-11-27 RX ADMIN — ONDANSETRON HYDROCHLORIDE 4 MG: 2 INJECTION, SOLUTION INTRAMUSCULAR; INTRAVENOUS at 15:24

## 2021-11-27 RX ADMIN — ACETAMINOPHEN 650 MG: 325 TABLET ORAL at 23:15

## 2021-11-27 RX ADMIN — ENOXAPARIN SODIUM 40 MG: 100 INJECTION SUBCUTANEOUS at 18:23

## 2021-11-27 RX ADMIN — ACETAMINOPHEN 650 MG: 325 TABLET ORAL at 15:26

## 2021-11-27 RX ADMIN — Medication 1500 MG: at 15:51

## 2021-11-27 RX ADMIN — FERROUS SULFATE TAB 325 MG (65 MG ELEMENTAL FE) 325 MG: 325 (65 FE) TAB at 21:34

## 2021-11-27 RX ADMIN — SODIUM CHLORIDE, POTASSIUM CHLORIDE, SODIUM LACTATE AND CALCIUM CHLORIDE 1779 ML: 600; 310; 30; 20 INJECTION, SOLUTION INTRAVENOUS at 15:25

## 2021-11-27 RX ADMIN — CEFEPIME 2000 MG: 2 INJECTION, POWDER, FOR SOLUTION INTRAVENOUS at 18:17

## 2021-11-27 ASSESSMENT — PAIN SCALES - GENERAL
PAINLEVEL_OUTOF10: 3
PAINLEVEL_OUTOF10: 8
PAINLEVEL_OUTOF10: 7
PAINLEVEL_OUTOF10: 8
PAINLEVEL_OUTOF10: 8

## 2021-11-27 ASSESSMENT — ENCOUNTER SYMPTOMS
COLOR CHANGE: 0
ABDOMINAL PAIN: 0
SHORTNESS OF BREATH: 0
SORE THROAT: 0
RHINORRHEA: 0

## 2021-11-27 ASSESSMENT — PAIN DESCRIPTION - DESCRIPTORS: DESCRIPTORS: BURNING;PRESSURE;THROBBING

## 2021-11-27 ASSESSMENT — PAIN DESCRIPTION - PAIN TYPE: TYPE: ACUTE PAIN

## 2021-11-27 ASSESSMENT — PAIN DESCRIPTION - LOCATION: LOCATION: BUTTOCKS

## 2021-11-27 NOTE — LETTER
[phone] [id number]  Phone: [phone]             November 30, 2021    Patient: Alesha Angel   YOB: 1970   Date of Visit: 11/27/2021       To Whom It May Concern:    Shekhar Rodriguez was seen and treated in our facility  beginning 11/27/2021 until **11/30/21*. She may return to work on 12/6/21 full duty.       Sincerely,       Aureliano Phipps RN         Signature:__________________________________

## 2021-11-27 NOTE — H&P
Hospital Medicine History & Physical      PCP: MEG Khan    Date of Admission: 11/27/2021    Date of Service: Pt seen/examined on 11/27/2021 and Admitted to Inpatient      Chief Complaint: L gluteal pain. History Of Present Illness: The patient is a 49 y.o. female w hx of hysterectomy and anemia due to abnormal uterine bleeding who presents with left gluteal pain. Pt reports it started Tuesday as a very small pimple. She then used her hot tab and over the next few days noticed edema worsening erythema and worsening pain. Her daughter (a nurse) tried to expel puss from it but to no avail. She went to urgent care and was started on Bactrim but unfortunately edema and erythema and pain continued worse and she started developing fever. So came to ED today. Past Medical History:        Diagnosis Date    Abnormal Pap smear of cervix     Abnormal uterine bleeding (AUB)     Menopausal symptoms     PONV (postoperative nausea and vomiting)     Severe anemia 6/19/2020    Urinary incontinence        Past Surgical History:        Procedure Laterality Date    HYSTERECTOMY, VAGINAL N/A 6/18/2020    LAPAROSCOPIC ASSISTED VAGINAL HYSTERECTOMY WITH BILATERAL SALPINGECTOMY AND VIDEO CYSTOSCOPY  CPT CODE - 33138, 81474, 43608, 49933 performed by Jaunita Spatz, DO at [address] [place] [address]         Medications Prior to Admission:    Prior to Admission medications    Medication Sig Start Date End Date Taking?  Authorizing Provider   ibuprofen (ADVIL;MOTRIN) 800 MG tablet Take 1 tablet by mouth every 8 hours as needed for Pain 6/19/20   Jaunita Spatz, DO   ondansetron (ZOFRAN) 4 MG tablet Take 1 tablet by mouth every 8 hours as needed for Nausea 6/19/20   Jaunita Spatz, DO   docusate sodium (COLACE, DULCOLAX) 100 MG CAPS Take 100 mg by mouth 2 times daily 6/19/20   Trisha Mills DO   ferrous sulfate (IRON 325) 325 (65 Fe) MG tablet Take 1 tablet by mouth 2 times daily 6/19/20   Trisha Gene DO   lidocaine (LIDODERM) 5 % Place 1 patch onto the skin daily 12 hours on, 12 hours off. 9/10/18   Alfreda Lundy PA-C   meclizine (ANTIVERT) 50 MG tablet Take 1 tablet by mouth 2 times daily as needed for Dizziness or Nausea 2/19/17   Chloe Reyes PA-C       Allergies:  Penicillins    Social History:  The patient currently lives at home. TOBACCO:   reports that she has never smoked. She has never used smokeless tobacco.  ETOH:   reports current alcohol use. Family History:  Reviewed in detail and negative for DM, Early CAD, Cancer, CVA. Positive as follows:        Problem Relation Age of Onset    Heart Attack Paternal Grandfather     Dementia Paternal Grandmother     Osteoporosis Maternal Grandmother     No Known Problems Maternal Grandfather     Cancer Mother        REVIEW OF SYSTEMS:   As noted in the HPI. All other systems reviewed and negative. PHYSICAL EXAM:    /71   Pulse 97   Temp 99 °F (37.2 °C) (Oral)   Resp 16   Ht 5' 6\" (1.676 m)   Wt 175 lb (79.4 kg)   LMP 06/08/2020 (Exact Date)   SpO2 98%   BMI 28.25 kg/m²     General appearance: No apparent distress appears stated age and cooperative. HEENT Normal cephalic, atraumatic without obvious deformity. Pupils equal, round, and reactive to light. Extra ocular muscles intact. Conjunctivae/corneas clear. Neck: Supple, No jugular venous distention/bruits. Trachea midline without thyromegaly or adenopathy with full range of motion. Lungs: Clear to auscultation, bilaterally without Rales/Wheezes/Rhonchi with good respiratory effort.   Heart: Regular rate and rhythm with Normal S1/S2 without murmurs, rubs or gallops, point of maximum impulse non-displaced  Abdomen: Soft, non-tender or non-distended without rigidity or guarding and positive bowel sounds all four quadrants. Extremities: No clubbing, cyanosis, or edema bilaterally. Full range of motion without deformity and normal gait intact. Skin: large fluctuant (>10cm) circular area of erythema with central necrotic ulceration and indurated and tender in her left buttock with erythema encompassing most of the left buttock. Mental status: Alert, oriented, thought content appropriate. Capillary Refill: Acceptable  < 3 seconds  Peripheral Pulses: +3 Easily felt, not easily obliterated with pressure        CBC   Recent Labs     11/27/21  1440   WBC 15.4*   HGB 16.0   HCT 49.8*         RENAL  Recent Labs     11/27/21  1612      K 3.8      CO2 23   BUN 7   CREATININE 0.8     LFT'S  Recent Labs     11/27/21  1612   AST 12*   ALT 14   BILITOT 0.3   ALKPHOS 75     COAG  No results for input(s): INR in the last 72 hours. CARDIAC ENZYMES  No results for input(s): CKTOTAL, CKMB, CKMBINDEX, TROPONINI in the last 72 hours. U/A:    Lab Results   Component Value Date    COLORU Yellow 11/27/2021    WBCUA 0-2 11/27/2021    RBCUA 5-10 11/27/2021    BACTERIA 1+ 11/27/2021    CLARITYU Clear 11/27/2021    SPECGRAV <=1.005 11/27/2021    LEUKOCYTESUR Negative 11/27/2021    BLOODU MODERATE 11/27/2021    GLUCOSEU Negative 11/27/2021       ABG  No results found for: RJE7HOJ, BEART, U9FZMRGM, PHART, THGBART, EPW8HZD, PO2ART, COS2USH        Active Hospital Problems    Diagnosis Date Noted    Gluteal abscess [L02.31] 11/27/2021         PHYSICIANS CERTIFICATION:    I certify that Guido English is expected to be hospitalized for more than 2 midnights based on the following assessment and plan:      ASSESSMENT/PLAN:      Sepsis. Left Gluteal Cellulitis with suspected underlying loculated abscess. IV vanc and cefepime. Failed OP bactrim therapy -  I suspect pseudomonas infection with hx of Hot tub exposure. Will ask surgery team to eval - this will likely require drainage.        DVT Prophylaxis: lovenox  Diet: ADULT DIET; Regular  Code Status: Full Code      Dispo - inpatient. Earl Fox MD    Thank you MEG Randhawa for the opportunity to be involved in this patient's care. If you have any questions or concerns please feel free to contact me at [phone].

## 2021-11-27 NOTE — ED PROVIDER NOTES
[address] ED  EMERGENCY DEPARTMENT ENCOUNTER        Pt Name: Moshe Jean Baptiste  MRN: [id number]  [place] 1970  Date of evaluation: 11/27/2021  Provider: OPAL Young - MABLE  PCP: MEG Yusuf  ED Attending: No att. providers found    [facility]       Chief Complaint   Patient presents with    Abscess     Patient states that she was seen yesterday at urgent care and treated for a staph infection on left buttocks and started on Bactrum. Pain and swelling has increased.  Fever       HISTORY OF PRESENT ILLNESS   (Location/Symptom, Timing/Onset, Context/Setting, Quality, Duration, Modifying Factors, Severity)  Note limiting factors. Moshe Jean Baptiste is a 49 y.o. female for left-sided gluteal cleft cellulitis. Onset was 4 days. Context includes patient states 4 days ago she started with a small pimple to her left butt cheek. Patient reports that her daughter attempted to express the discharge however it has progressed over the last 4 days to become more erythematous hard and painful. Patient was seen at an urgent care yesterday and started on Bactrim. She has had 2 doses of Bactrim but today woke up with a fever and redness that is enlarging to her left gluteal cleft. Alleviating factors include nothing. Aggravating factors include nothing. Pain is 8/10. Migraine Excedrin has been used for pain today. Nursing Notes were all reviewed and agreed with or any disagreements were addressed  in the HPI. REVIEW OF SYSTEMS  (2-9 systems for level 4, 10 or more for level 5)     Review of Systems   Constitutional: Positive for fever. HENT: Negative for congestion, rhinorrhea and sore throat. Respiratory: Negative for shortness of breath. Cardiovascular: Negative for chest pain. Gastrointestinal: Negative for abdominal pain. Genitourinary: Negative for decreased urine volume and difficulty urinating.    Musculoskeletal: Negative for arthralgias and myalgias. Skin: Positive for wound. Negative for color change and rash. Neurological: Negative for dizziness and light-headedness. Psychiatric/Behavioral: Negative for agitation. All other systems reviewed and are negative. Positivesand Pertinent negatives as per HPI. Except as noted above in the ROS, all other systems were reviewed and negative. PAST MEDICAL HISTORY     Past Medical History:   Diagnosis Date    Abnormal Pap smear of cervix     Abnormal uterine bleeding (AUB)     Menopausal symptoms     PONV (postoperative nausea and vomiting)     Severe anemia 6/19/2020    Urinary incontinence          SURGICAL HISTORY       Past Surgical History:   Procedure Laterality Date    HYSTERECTOMY, VAGINAL N/A 6/18/2020    LAPAROSCOPIC ASSISTED VAGINAL HYSTERECTOMY WITH BILATERAL SALPINGECTOMY AND VIDEO CYSTOSCOPY  CPT CODE - 45240, 46156, 15845, 50746 performed by Irma Hernandez DO at /[facility]       Previous Medications    DOCUSATE SODIUM (COLACE, DULCOLAX) 100 MG CAPS    Take 100 mg by mouth 2 times daily    FERROUS SULFATE (IRON 325) 325 (65 FE) MG TABLET    Take 1 tablet by mouth 2 times daily    IBUPROFEN (ADVIL;MOTRIN) 800 MG TABLET    Take 1 tablet by mouth every 8 hours as needed for Pain    LIDOCAINE (LIDODERM) 5 %    Place 1 patch onto the skin daily 12 hours on, 12 hours off.     MECLIZINE (ANTIVERT) 50 MG TABLET    Take 1 tablet by mouth 2 times daily as needed for Dizziness or Nausea    ONDANSETRON (ZOFRAN) 4 MG TABLET    Take 1 tablet by mouth every 8 hours as needed for Nausea         ALLERGIES     Penicillins    FAMILY HISTORY       Family History   Problem Relation Age of Onset    Heart Attack Paternal Grandfather     Dementia Paternal Grandmother     Osteoporosis Maternal Grandmother     No Known Problems Maternal Grandfather     Cancer Mother          SOCIAL HISTORY       Social History Socioeconomic History    Marital status:      Spouse name: Not on file    Number of children: Not on file    Years of education: Not on file    Highest education level: Not on file   Occupational History    Not on file   Tobacco Use    Smoking status: Never Smoker    Smokeless tobacco: Never Used   Vaping Use    Vaping Use: Never used   Substance and Sexual Activity    Alcohol use: Yes     Comment: socially    Drug use: No    Sexual activity: Yes     Partners: Male   Other Topics Concern    Not on file   Social History Narrative    Not on file     Social Determinants of Health     Financial Resource Strain:     Difficulty of Paying Living Expenses: Not on file   Food Insecurity:     Worried About Running Out of Food in the Last Year: Not on file    Heaven of Food in the Last Year: Not on file   Transportation Needs:     Lack of Transportation (Medical): Not on file    Lack of Transportation (Non-Medical):  Not on file   Physical Activity:     Days of Exercise per Week: Not on file    Minutes of Exercise per Session: Not on file   Stress:     Feeling of Stress : Not on file   Social Connections:     Frequency of Communication with Friends and Family: Not on file    Frequency of Social Gatherings with Friends and Family: Not on file    Attends Worship Services: Not on file    Active Member of [address] [place] [address] [employer] or Organizations: Not on file    Attends Club or Organization Meetings: Not on file    Marital Status: Not on file   Intimate Partner Violence:     Fear of Current or Ex-Partner: Not on file    Emotionally Abused: Not on file    Physically Abused: Not on file    Sexually Abused: Not on file   Housing Stability:     Unable to Pay for Housing in the Last Year: Not on file    Number of [place] in the Last Year: Not on file    Unstable Housing in the Last Year: Not on file       SCREENINGS             PHYSICAL EXAM    (up to 7 for level 4, 8 ormore for level 5)     ED Triage Vitals [11/27/21 1404]   BP Temp Temp src Pulse Resp SpO2 Height Weight   -- 101 °F (38.3 °C) -- 106 15 98 % 5' 6\" (1.676 m) 175 lb (79.4 kg)       Physical Exam  Constitutional:       Appearance: She is well-developed. She is obese. HENT:      Head: Normocephalic and atraumatic. Cardiovascular:      Rate and Rhythm: Normal rate. Pulmonary:      Effort: Pulmonary effort is normal. No respiratory distress. Abdominal:      General: There is no distension. Palpations: Abdomen is soft. Tenderness: There is no abdominal tenderness. Musculoskeletal:         General: Normal range of motion. Cervical back: Normal range of motion. Skin:     General: Skin is warm and dry. Neurological:      Mental Status: She is alert and oriented to person, place, and time.          DIAGNOSTIC RESULTS   LABS:    Labs Reviewed   CBC WITH AUTO DIFFERENTIAL - Abnormal; Notable for the following components:       Result Value    WBC 15.4 (*)     RBC 5.27 (*)     Hematocrit 49.8 (*)     Neutrophils Absolute 13.2 (*)     All other components within normal limits    Narrative:     Performed at:  [facility],  [facility], [facility]   Phone [phone]   URINALYSIS - Abnormal; Notable for the following components:    Blood, Urine MODERATE (*)     All other components within normal limits    Narrative:     Performed at:  [facility],  [facility], [facility]   Phone [phone]   COMPREHENSIVE METABOLIC PANEL W/ REFLEX TO MG FOR LOW K - Abnormal; Notable for the following components:    Glucose 175 (*)     AST 12 (*)     All other components within normal limits    Narrative:     Performed at:  [facility]) [facility],  [employer], [facility]   Phone [phone]   MICROSCOPIC URINALYSIS - Abnormal; Notable for the following components:    RBC, UA 5-10 (*)     Bacteria, UA 1+ (*) All other components within normal limits    Narrative:     Performed at:  [facility],  [place], [facility]   Phone [phone], RAPID    Narrative:     Performed at:  [facility],  [place], [place]   Phone [phone]   CULTURE, BLOOD 1   CULTURE, BLOOD 2   LACTATE, SEPSIS    Narrative:     Performed at:  [facility],  [place], [facility]   Phone [phone]   PROCALCITONIN    Narrative:     Performed at:  [facility]) [facility],  [place], [facility]   Phone [phone]       All other labs were within normal range or not returned as of this dictation. EKG: All EKG's are interpreted by the Emergency Department Physician who either signs or Co-signs this chart in the absence of a cardiologist.  Please see their note for interpretation of EKG. RADIOLOGY:         Interpretation per the Radiologist below, if available at the time of this note:    No orders to display     No results found.       PROCEDURES   Unless otherwise noted below, none     Procedures    CRITICAL CARE TIME   N/A    CONSULTS:  IP CONSULT TO HOSPITALIST  IP CONSULT TO PHARMACY  IP CONSULT TO GENERAL SURGERY      EMERGENCY DEPARTMENT COURSE and DIFFERENTIAL DIAGNOSIS/MDM:   Vitals:    Vitals:    11/27/21 1404 11/27/21 1550   BP: 136/71 136/71   Pulse: 106 97   Resp: 15 16   Temp: 101 °F (38.3 °C) 99 °F (37.2 °C)   TempSrc:  Oral   SpO2: 98%    Weight: 175 lb (79.4 kg)    Height: 5' 6\" (1.676 m)        Patient was given the following medications:  Medications   cefepime (MAXIPIME) 2000 mg IVPB minibag (2,000 mg IntraVENous Not Given 11/27/21 1725)   vancomycin 1500 mg in dextrose 5% 300 mL IVPB (1,500 mg IntraVENous New Bag 11/27/21 1551)   docusate (COLACE, DULCOLAX) CAPS 100 mg (has no administration in time range) ferrous sulfate (IRON 325) tablet 325 mg (has no administration in time range)   ibuprofen (ADVIL;MOTRIN) tablet 800 mg (has no administration in time range)   0.9 % sodium chloride infusion (has no administration in time range)   cefepime (MAXIPIME) 2000 mg IVPB minibag (has no administration in time range)   sodium chloride flush 0.9 % injection 5-40 mL (has no administration in time range)   sodium chloride flush 0.9 % injection 5-40 mL (has no administration in time range)   0.9 % sodium chloride infusion (has no administration in time range)   enoxaparin (LOVENOX) injection 40 mg (has no administration in time range)   ondansetron (ZOFRAN-ODT) disintegrating tablet 4 mg (has no administration in time range)     Or   ondansetron (ZOFRAN) injection 4 mg (has no administration in time range)   polyethylene glycol (GLYCOLAX) packet 17 g (has no administration in time range)   acetaminophen (TYLENOL) tablet 650 mg (has no administration in time range)     Or   acetaminophen (TYLENOL) suppository 650 mg (has no administration in time range)   lactated ringers bolus (1,779 mLs IntraVENous New Bag 11/27/21 1525)   acetaminophen (TYLENOL) tablet 650 mg (650 mg Oral Given 11/27/21 1526)   ondansetron (ZOFRAN) injection 4 mg (4 mg IntraVENous Given 11/27/21 1524)   ketorolac (TORADOL) injection 15 mg (15 mg IntraVENous Given 11/27/21 1550)       Was seen and evaluated by myself and . Patient here for concerns for a infection to her left butt cheek. Patient reports that she has had it for the last couple of days. Patient reports that she has recently been started on Bactrim however spine taken 2 doses. Patient reports that her daughter attempted to express the discharge however the redness to her left buttocks has become worse. Patient has also developed a fever today. On exam she is awake and alert she was found to meet sepsis criteria with a fever and tachycardia. Sepsis protocol was initiated in the ED.  Lab values have been reviewed and interpreted. Patient was given IV antibiotics. Patient was offered pain medications however states that she gets nauseous and did not want the morphine requested Toradol. She was given dose of Toradol. Consult was placed to the hospitalist for admission. Hospitalist is accepted. Patient's care is transferred to the inpatient unit. CRITICAL CARE NOTE:  There was a high probability of clinically significant life-threatening deterioration of the patient's condition requiring my urgent intervention. Total critical care time is 35 minutes. This includes vital sign monitoring, pulse oximetry monitoring, telemetry monitoring, clinical response to the IV medications, reviewing the nursing notes, consultation time, dictation/documentation time, and interpretation of the labwork. The patient tolerated their visit well. They were seen and evaluated by the attending physician, No att. providers found who agreed with the assessment and plan. The patient and / or the family were informed of the results of any tests, a time was given to answer questions, a plan was proposed and they agreed with plan. FINAL IMPRESSION      1. Cellulitis of buttock    2. Failure of outpatient treatment          DISPOSITION/PLAN   DISPOSITION Admitted 11/27/2021 05:09:58 PM      PATIENT REFERRED TO:  No follow-up provider specified.     DISCHARGE MEDICATIONS:  New Prescriptions    No medications on file       DISCONTINUED MEDICATIONS:  Discontinued Medications    No medications on file              (Please note that portions of this note were completed with a voice recognition program.  Efforts were made to edit the dictations but occasionally words are mis-transcribed.)    OPAL Barnhart CNP (electronically signed)       OPAL Barnhart CNP  11/27/21 [facility], APRN - CNP  11/27/21 1879

## 2021-11-28 LAB
A/G RATIO: 1.3 (ref 1.1–2.2)
ALBUMIN SERPL-MCNC: 3.5 G/DL (ref 3.4–5)
ALP BLD-CCNC: 70 U/L (ref 40–129)
ALT SERPL-CCNC: 21 U/L (ref 10–40)
ANION GAP SERPL CALCULATED.3IONS-SCNC: 10 MMOL/L (ref 3–16)
AST SERPL-CCNC: 19 U/L (ref 15–37)
BASOPHILS ABSOLUTE: 0 K/UL (ref 0–0.2)
BASOPHILS RELATIVE PERCENT: 0.3 %
BILIRUB SERPL-MCNC: 0.5 MG/DL (ref 0–1)
BUN BLDV-MCNC: 6 MG/DL (ref 7–20)
CALCIUM SERPL-MCNC: 8.5 MG/DL (ref 8.3–10.6)
CHLORIDE BLD-SCNC: 104 MMOL/L (ref 99–110)
CO2: 23 MMOL/L (ref 21–32)
CREAT SERPL-MCNC: <0.5 MG/DL (ref 0.6–1.1)
EOSINOPHILS ABSOLUTE: 0.1 K/UL (ref 0–0.6)
EOSINOPHILS RELATIVE PERCENT: 0.5 %
GFR AFRICAN AMERICAN: >60
GFR NON-AFRICAN AMERICAN: >60
GLUCOSE BLD-MCNC: 113 MG/DL (ref 70–99)
HCT VFR BLD CALC: 43.1 % (ref 36–48)
HEMOGLOBIN: 14.1 G/DL (ref 12–16)
LYMPHOCYTES ABSOLUTE: 0.9 K/UL (ref 1–5.1)
LYMPHOCYTES RELATIVE PERCENT: 7.1 %
MCH RBC QN AUTO: 31 PG (ref 26–34)
MCHC RBC AUTO-ENTMCNC: 32.7 G/DL (ref 31–36)
MCV RBC AUTO: 94.7 FL (ref 80–100)
MONOCYTES ABSOLUTE: 1.1 K/UL (ref 0–1.3)
MONOCYTES RELATIVE PERCENT: 8.2 %
NEUTROPHILS ABSOLUTE: 10.9 K/UL (ref 1.7–7.7)
NEUTROPHILS RELATIVE PERCENT: 83.9 %
PDW BLD-RTO: 12.7 % (ref 12.4–15.4)
PHOSPHORUS: 2.4 MG/DL (ref 2.5–4.9)
PLATELET # BLD: 208 K/UL (ref 135–450)
PMV BLD AUTO: 8.2 FL (ref 5–10.5)
POTASSIUM SERPL-SCNC: 4.1 MMOL/L (ref 3.5–5.1)
RBC # BLD: 4.55 M/UL (ref 4–5.2)
SODIUM BLD-SCNC: 137 MMOL/L (ref 136–145)
TOTAL PROTEIN: 6.3 G/DL (ref 6.4–8.2)
VANCOMYCIN TROUGH: 4.2 UG/ML (ref 10–20)
WBC # BLD: 13 K/UL (ref 4–11)

## 2021-11-28 PROCEDURE — 96376 TX/PRO/DX INJ SAME DRUG ADON: CPT

## 2021-11-28 PROCEDURE — 36415 COLL VENOUS BLD VENIPUNCTURE: CPT

## 2021-11-28 PROCEDURE — 2580000003 HC RX 258: Performed by: INTERNAL MEDICINE

## 2021-11-28 PROCEDURE — 6360000002 HC RX W HCPCS: Performed by: INTERNAL MEDICINE

## 2021-11-28 PROCEDURE — 80053 COMPREHEN METABOLIC PANEL: CPT

## 2021-11-28 PROCEDURE — 6370000000 HC RX 637 (ALT 250 FOR IP): Performed by: INTERNAL MEDICINE

## 2021-11-28 PROCEDURE — 96372 THER/PROPH/DIAG INJ SC/IM: CPT

## 2021-11-28 PROCEDURE — 84100 ASSAY OF PHOSPHORUS: CPT

## 2021-11-28 PROCEDURE — 99024 POSTOP FOLLOW-UP VISIT: CPT | Performed by: SURGERY

## 2021-11-28 PROCEDURE — 1200000000 HC SEMI PRIVATE

## 2021-11-28 PROCEDURE — G0378 HOSPITAL OBSERVATION PER HR: HCPCS

## 2021-11-28 PROCEDURE — 96366 THER/PROPH/DIAG IV INF ADDON: CPT

## 2021-11-28 PROCEDURE — 85025 COMPLETE CBC W/AUTO DIFF WBC: CPT

## 2021-11-28 PROCEDURE — 80202 ASSAY OF VANCOMYCIN: CPT

## 2021-11-28 RX ORDER — IBUPROFEN 400 MG/1
400 TABLET ORAL EVERY 6 HOURS PRN
Status: COMPLETED | OUTPATIENT
Start: 2021-11-28 | End: 2021-11-29

## 2021-11-28 RX ORDER — OXYCODONE HYDROCHLORIDE AND ACETAMINOPHEN 5; 325 MG/1; MG/1
1 TABLET ORAL EVERY 4 HOURS PRN
Status: DISCONTINUED | OUTPATIENT
Start: 2021-11-28 | End: 2021-11-29

## 2021-11-28 RX ADMIN — FERROUS SULFATE TAB 325 MG (65 MG ELEMENTAL FE) 325 MG: 325 (65 FE) TAB at 11:27

## 2021-11-28 RX ADMIN — OXYCODONE HYDROCHLORIDE AND ACETAMINOPHEN 1 TABLET: 5; 325 TABLET ORAL at 06:40

## 2021-11-28 RX ADMIN — ENOXAPARIN SODIUM 40 MG: 100 INJECTION SUBCUTANEOUS at 11:29

## 2021-11-28 RX ADMIN — ONDANSETRON HYDROCHLORIDE 4 MG: 2 INJECTION, SOLUTION INTRAMUSCULAR; INTRAVENOUS at 17:51

## 2021-11-28 RX ADMIN — CEFEPIME 2000 MG: 2 INJECTION, POWDER, FOR SOLUTION INTRAVENOUS at 06:40

## 2021-11-28 RX ADMIN — FERROUS SULFATE TAB 325 MG (65 MG ELEMENTAL FE) 325 MG: 325 (65 FE) TAB at 20:53

## 2021-11-28 RX ADMIN — VANCOMYCIN HYDROCHLORIDE 1250 MG: 10 INJECTION, POWDER, LYOPHILIZED, FOR SOLUTION INTRAVENOUS at 04:16

## 2021-11-28 RX ADMIN — DOCUSATE SODIUM 100 MG: 100 CAPSULE ORAL at 20:53

## 2021-11-28 RX ADMIN — POTASSIUM & SODIUM PHOSPHATES POWDER PACK 280-160-250 MG 250 MG: 280-160-250 PACK at 20:53

## 2021-11-28 RX ADMIN — SODIUM CHLORIDE: 9 INJECTION, SOLUTION INTRAVENOUS at 17:55

## 2021-11-28 RX ADMIN — VANCOMYCIN HYDROCHLORIDE 1250 MG: 10 INJECTION, POWDER, LYOPHILIZED, FOR SOLUTION INTRAVENOUS at 23:05

## 2021-11-28 RX ADMIN — SODIUM CHLORIDE: 9 INJECTION, SOLUTION INTRAVENOUS at 07:45

## 2021-11-28 RX ADMIN — IBUPROFEN 400 MG: 400 TABLET, FILM COATED ORAL at 21:14

## 2021-11-28 RX ADMIN — DOCUSATE SODIUM 100 MG: 100 CAPSULE ORAL at 11:27

## 2021-11-28 RX ADMIN — CEFEPIME 2000 MG: 2 INJECTION, POWDER, FOR SOLUTION INTRAVENOUS at 18:01

## 2021-11-28 ASSESSMENT — PAIN SCALES - GENERAL
PAINLEVEL_OUTOF10: 7

## 2021-11-28 ASSESSMENT — PAIN DESCRIPTION - LOCATION: LOCATION: BUTTOCKS;HEAD;NECK

## 2021-11-28 ASSESSMENT — PAIN DESCRIPTION - PAIN TYPE: TYPE: ACUTE PAIN

## 2021-11-28 NOTE — CONSULTS
Department of General Surgery Consult    PATIENT NAME: Rosemary Mckeon OF BIRTH: 1970    ADMISSION DATE: 11/27/2021  2:07 PM      TODAY'S DATE: 11/27/2021    Reason for Consult: Left buttock cellulitis with possible abscess    Chief Complaint: Left buttock pain and redness    Requesting Physician:  Galindo    HISTORY OF PRESENT ILLNESS:              The patient is a 49 y.o. female who presents with pain and swelling in her left buttock. She states that this started about 4 days ago as a pimple. It got progressively worse over the course of the past few days. It was hard, red, and painful. She was seen in the urgent care yesterday and started on antibiotics. She woke up with fever and worsening redness in the area today. It is so painful she can hardly sit on it.     Past Medical History:        Diagnosis Date    Abnormal Pap smear of cervix     Abnormal uterine bleeding (AUB)     Menopausal symptoms     PONV (postoperative nausea and vomiting)     Severe anemia 6/19/2020    Urinary incontinence        Past Surgical History:        Procedure Laterality Date    HYSTERECTOMY, VAGINAL N/A 6/18/2020    LAPAROSCOPIC ASSISTED VAGINAL HYSTERECTOMY WITH BILATERAL SALPINGECTOMY AND VIDEO CYSTOSCOPY  CPT CODE - 89352, 29868, 00769, 07351 performed by Lavell Wheeler DO at [address] [place] [address]         Current Medications:   Current Facility-Administered Medications: docusate sodium (COLACE) capsule 100 mg, 100 mg, Oral, BID  ferrous sulfate (IRON 325) tablet 325 mg, 325 mg, Oral, BID  0.9 % sodium chloride infusion, , IntraVENous, Continuous  cefepime (MAXIPIME) 2000 mg IVPB minibag, 2,000 mg, IntraVENous, Q12H  sodium chloride flush 0.9 % injection 5-40 mL, 5-40 mL, IntraVENous, 2 times per day  sodium chloride flush 0.9 % injection 5-40 mL, 5-40 mL, IntraVENous, PRN  0.9 % sodium chloride infusion, 25 mL, IntraVENous, PRN  enoxaparin (LOVENOX) injection 40 mg, 40 mg, SubCUTAneous, Daily  ondansetron (ZOFRAN-ODT) disintegrating tablet 4 mg, 4 mg, Oral, Q8H PRN **OR** ondansetron (ZOFRAN) injection 4 mg, 4 mg, IntraVENous, Q6H PRN  polyethylene glycol (GLYCOLAX) packet 17 g, 17 g, Oral, Daily PRN  acetaminophen (TYLENOL) tablet 650 mg, 650 mg, Oral, Q6H PRN **OR** acetaminophen (TYLENOL) suppository 650 mg, 650 mg, Rectal, Q6H PRN  Prior to Admission medications    Medication Sig Start Date End Date Taking? Authorizing Provider   ibuprofen (ADVIL;MOTRIN) 800 MG tablet Take 1 tablet by mouth every 8 hours as needed for Pain 6/19/20   Patricia Lo, DO   ondansetron (ZOFRAN) 4 MG tablet Take 1 tablet by mouth every 8 hours as needed for Nausea 6/19/20   Patricia Lo, DO   docusate sodium (COLACE, DULCOLAX) 100 MG CAPS Take 100 mg by mouth 2 times daily 6/19/20   Patricia Lo, DO   ferrous sulfate (IRON 325) 325 (65 Fe) MG tablet Take 1 tablet by mouth 2 times daily 6/19/20   Patricia Lo, DO   lidocaine (LIDODERM) 5 % Place 1 patch onto the skin daily 12 hours on, 12 hours off. 9/10/18   Tyrese Lundy PA-C   meclizine (ANTIVERT) 50 MG tablet Take 1 tablet by mouth 2 times daily as needed for Dizziness or Nausea 2/19/17   Chloe Reyes PA-C        Allergies:  Penicillins    Social History:   TOBACCO:   reports that she has never smoked. She has never used smokeless tobacco.  ETOH:   reports current alcohol use. DRUGS:   reports no history of drug use.       Family History:        Problem Relation Age of Onset    Heart Attack Paternal Grandfather     Dementia Paternal Grandmother     Osteoporosis Maternal Grandmother     No Known Problems Maternal Grandfather     Cancer Mother        REVIEW OF SYSTEMS:  CONSTITUTIONAL:  positive for  fevers and chills  HEENT:  negative  RESPIRATORY:  negative  CARDIOVASCULAR:  negative  GASTROINTESTINAL:  negative  GENITOURINARY:  negative  HEMATOLOGIC/LYMPHATIC:  negative  NEUROLOGICAL: Negative  * All other ROS reviewed and negative. PHYSICAL EXAM:  VITALS:  /71   Pulse 97   Temp 99 °F (37.2 °C) (Oral)   Resp 16   Ht 5' 6\" (1.676 m)   Wt 175 lb (79.4 kg)   LMP 06/08/2020 (Exact Date)   SpO2 98%   BMI 28.25 kg/m²   24HR INTAKE/OUTPUT:    No intake/output data recorded. No intake/output data recorded. CONSTITUTIONAL:  alert, no apparent distress and normal weight  EYES:  PERRL, sclera clear  ENT:  Normocephalic,atraumatic, without obvious abnormality  NECK:  supple, symmetrical, trachea midline  LUNGS: Resp effort easy and unlabored, clear to auscultation  CARDIOVASCULAR:  NO JVD, regular rate and rhythm and no murmur noted  ABDOMEN:  normal bowel sounds, soft, non-distended, non-tender  MUSCULOSKELETAL: No clubbing or cyanosis, 0+ pitting edema lower extremities  NEUROLOGIC:  Mental Status Exam:  Level of Alertness:   awake  PSYCHIATRIC:   person, place, time  SKIN: Left gluteal region with erythema measuring approximately 12 cm in diameter with induration, central erosion, and some underlying fluctuance    DATA:    CBC:   Recent Labs     11/27/21  1440   WBC 15.4*   HGB 16.0   HCT 49.8*        BMP:    Recent Labs     11/27/21  1612      K 3.8      CO2 23   BUN 7   CREATININE 0.8   GLUCOSE 175*     Hepatic:   Recent Labs     11/27/21  1612   AST 12*   ALT 14   BILITOT 0.3   ALKPHOS 75     Mag:    No results for input(s): MG in the last 72 hours. Phos:   No results for input(s): PHOS in the last 72 hours. INR: No results for input(s): INR in the last 72 hours. Radiology Review: Images personally reviewed by me. N/A      IMPRESSION/RECOMMENDATIONS:    Left gluteal cellulitis with likely abscess. Based on the amount of induration I suspect this represents a MRSA infection. She is being admitted for IV antibiotics. We will proceed with incision and drainage and obtain cultures.     PROCEDURE: After obtaining verbal consent, the patient was placed in a right lateral decubitus position. Her left buttock was sterilely prepped and draped and anesthetized with local anesthetic. A cruciate incision was made in the central area of the cellulitis and an abscess cavity was identified within the subcutaneous tissues. Cultures were obtained. Loculations were broken up with hemostat. I cut the corners off of the cruciate incision in order to leave this wide open for drainage. The wound was irrigated and packed with a local anesthetic moistened 4 x 4. A dry dressing was applied.   She tolerated the procedure well    Electronically signed by Durel Goodell, MD     [address] [place] [address]

## 2021-11-28 NOTE — PROGRESS NOTES
Progress Note    Admit Date:  11/27/2021    Admitted with left gluteal abscess. Seen by general surgery, s/p I&D    Subjective:  Ms. Sally Giles complains of persistent pain and swelling in the left buttock area. Low-grade temp  Complains of nausea, appears ill    Objective:   BP (!) 121/53   Pulse 90   Temp 99 °F (37.2 °C) (Oral)   Resp 12   Ht 5' 6\" (1.676 m)   Wt 175 lb (79.4 kg)   LMP 06/08/2020 (Exact Date)   SpO2 97%   BMI 28.25 kg/m²   No intake or output data in the 24 hours ending 11/28/21 1721      Physical Exam:  General:  Awake, alert, NAD. Well oriented  Ill-appearing  Skin:  Warm and dry  Neck:  JVD absent. Neck supple  Chest:  Clear to auscultation, respiration easy. No wheezes, rales or rhonchi. Cardiovascular:  RRR ,S1S2 normal  Abdomen:  Soft, non tender, non distended, BS +  Left buttock area, I&D site open but not much drainage, surrounding erythema present, tender and firm  Extremities:  No edema. Intact peripheral pulses. Brisk cap refill, < 2 secs  Neuro: non focal      Medications:   Scheduled Meds:   potassium & sodium phosphates  1 packet Oral BID    docusate sodium  100 mg Oral BID    ferrous sulfate  325 mg Oral BID    cefepime  2,000 mg IntraVENous Q12H    sodium chloride flush  5-40 mL IntraVENous 2 times per day    enoxaparin  40 mg SubCUTAneous Daily    vancomycin  1,250 mg IntraVENous Q18H       Continuous Infusions:   sodium chloride 75 mL/hr at 11/28/21 0745    sodium chloride         Data:  CBC:   Recent Labs     11/27/21  1440 11/28/21  0716   WBC 15.4* 13.0*   RBC 5.27* 4.55   HGB 16.0 14.1   HCT 49.8* 43.1   MCV 94.6 94.7   RDW 12.9 12.7    208     BMP:   Recent Labs     11/27/21  1612 11/28/21  0716    137   K 3.8 4.1    104   CO2 23 23   PHOS  --  2.4*   BUN 7 6*   CREATININE 0.8 <0.5*     BNP: No results for input(s): BNP in the last 72 hours. PT/INR: No results for input(s): PROTIME, INR in the last 72 hours.   APTT: No results for input(s): APTT in the last 72 hours. CARDIAC ENZYMES: No results for input(s): CKMB, CKMBINDEX, TROPONINI in the last 72 hours. Invalid input(s): CKTOTAL;3  FASTING LIPID PANEL:No results found for: CHOL, HDL, TRIG  LIVER PROFILE:   Recent Labs     11/27/21  1612 11/28/21  0716   AST 12* 19   ALT 14 21   BILITOT 0.3 0.5   ALKPHOS 75 70          No orders to display         Assessment:  Active Problems:    Gluteal abscess  Resolved Problems:    * No resolved hospital problems. *      Plan:    Sepsis. Left Gluteal abscess with cellulitis   - Failed OP bactrim therapy -  I suspect pseudomonas infection with hx of Hot tub exposure. - Continue IV vanc and cefepime. Day #2  -General surgery consulted, s/p I&D. Cultures pending  White count improved from 15--> 13.        DVT Prophylaxis: lovenox  Diet: ADULT DIET; Regular  Code Status: Full Code        Dispo - inpatient.        Chelsea Mcclain MD, MD 11/28/2021 5:21 PM

## 2021-11-28 NOTE — CONSULTS
Pharmacy Note  Vancomycin Consult    Edel Godfrey is a 49 y.o. female started on Vancomycin for SSTI; consult received from Dr. Becki Hernandez to manage therapy. Also receiving the following antibiotics: cefepime. Patient Active Problem List   Diagnosis    Intramural leiomyoma of uterus    Cystocele, midline    Pelvic pain    Abnormal uterine bleeding    S/P laparoscopic assisted vaginal hysterectomy (LAVH)    Severe anemia    S/P laparoscopic assisted vaginal hysterectomy (LAVH)    Gluteal abscess     Allergies:  Penicillins     Recent Labs     11/27/21  1440 11/27/21  1612   BUN  --  7   CREATININE  --  0.8   WBC 15.4*  --      Ht Readings from Last 1 Encounters:   11/27/21 5' 6\" (1.676 m)        Wt Readings from Last 1 Encounters:   11/27/21 175 lb (79.4 kg)       Body mass index is 28.25 kg/m². Estimated Creatinine Clearance: 89 mL/min (based on SCr of 0.8 mg/dL). Goal Trough Level: 15-20 mcg/mL  Goal AUC: 400-600    Assessment/Plan:  Will initiate Vancomycin with a one time loading dose of 1500 mg x1, followed by 1250 mg IV every 18 hours. Per Pk-insight:  EXT91,ON: 223 mg/L.hr  Probability of AUC24 > 400: 52 %  Ctrough,ss: 11.3 mg/L  Probability of Ctrough,ss > 20: 12 %  Probability of nephrotoxicity (Lodise MINDY 2009): 7 %    Next trough: 11/28 @ 2130    Thank you for the consult. Will continue to follow.     Leann Randolph, ShmuelD, [facility], 11/27/2021 9:03 PM

## 2021-11-28 NOTE — ED NOTES
Christine Obando, [address] [facility]  [address]  Phone: [phone]  Fax: [phone]    PCP: Olman Henry MD  Date of visit: 2/19/20    Chief Complaint   Patient presents with    Back Pain     follow up       Interval:   Patient presents today for follow up after MBB injections on 2/06/20. Prior to the injections patient states that her pain was at a 10. Today she states that her pain has completely resolved in the back. She has bilateral knee pain that she is following with Dr. Marquita Castanon for. He pain was located in the low back without radiation. The pain score is rated at a 0- No pain. The pain has been better since MBB block injections. There is no associated numbness/tingling. There is no weakness, there is no bowel/bladder changes. The prior workup has included: Xray hips, MRI L spine      The prior treatment has included:  PT: made it worse   Chiropractic: none   Modalities: bengay with no relief   OTC Tylenol: yes with some relief   NSAIDS: yes with no relief, voltaren gel with relief   Membrane stabilizers: none    Muscle relaxers: none   Previous injections: 1/17/19 bilateral SI joint with 100% relief  9/19/19 bilateral SI joint injections    2/02/19  # 1 Fluoroscopic guided lumbar medial branch blocks at  levels: medial branch level: L3, L4, L5 Joint: Bilateral L4-5, L5-S1. - 100% relief. Previous surgery at this site: none      Allergies   Allergen Reactions    Iodine      I.V.        Current Outpatient Medications   Medication Sig Dispense Refill    furosemide (LASIX) 20 MG tablet Take 1 tablet by mouth three times a week 36 tablet 0    apixaban (ELIQUIS) 2.5 MG TABS tablet Take 2.5 mg by mouth 2 times daily       donepezil (ARICEPT) 10 MG tablet Take 1 tablet by mouth nightly 90 tablet 1    memantine (NAMENDA) 10 MG tablet Take 1 tablet by mouth 2 times daily 180 tablet 1    citalopram (CELEXA) Report to [place], RN. Will be enroute to get patient.      Yvette Villasenor RN  11/28/21 4554 Problem Relation Age of Onset    Heart Disease Mother     Heart Disease Father      Social History     Tobacco Use    Smoking status: Never Smoker    Smokeless tobacco: Never Used   Substance Use Topics    Alcohol use: No    Drug use: No       Functional Status: The patient is able to ambulate and perform activities of daily living without the use of an assistive device. ROS:     Constitutional: Denies fevers, chills, night sweats, unintentional weight loss     Skin: Denies rash or skin changes     Eyes: Denies vision changes    Ears/Nose/Throat: Denies nasal congestion or sore throat     Respiratory: Denies SOB or cough     Cardiovascular: Denies CP, palpitations, edema      Gastrointestinal: Denies abdominal pain,  N/V, constipation, or diarrhea    Genitourinary: Denies urinary symptoms    Neurologic: See HPI.     MSK: See HPI. Psychiatric: Denies sleep disturbance, anxiety, depression    Hematologic/Lymphatic/Immunologic: Denies bruising       Physical Exam:   Blood pressure (!) 152/81, pulse 79, height 5' 4\" (1.626 m), weight 124 lb (56.2 kg). General: well developed and well nourished in no acute distress. Body habitus is thin  HEENT: No rhinorrhea, sneezing, yawning, or lacrimation. No scleral icterus or conjunctival injection. Resp: symmetrical chest expansion, unlabored breathing, respirations unlabored. CV: Heart rate is regular. Peripheral pulses are palpable  Lymph: No visible regional lymphadenopathy. Skin: No rashes or ecchymosis. Normal turgor. Psych: Mood is calm. Affect is normal.   Ext: No edema noted     MSK:   Back/Hip Exam:   Inspection: Pelvis was asymmetric. Lumbar lordotic curvature was decreased. There was no scoliosis. No ecchymoses or erythema. Palpation: Palpatory exam revealed no tenderness along lumbosacral paraspinals, no ttp midline spine, no ttp bilateral SI Joint sulci. There was no paraspinal spasms. There were no trigger points. ROM limited.

## 2021-11-28 NOTE — PROGRESS NOTES
[facility] SURGERY    PATIENT NAME: Aly Dominique     TODAY'S DATE: 11/28/2021    CHIEF COMPLAINT: Buttock pain    INTERVAL HISTORY/HPI:    Pt with improved but persistent buttock pain. She denies fevers overnight. REVIEW OF SYSTEMS:  Pertinent positives and negatives as per interval history section    OBJECTIVE:  VITALS:  BP (!) 121/53   Pulse 90   Temp 99 °F (37.2 °C) (Oral)   Resp 12   Ht 5' 6\" (1.676 m)   Wt 175 lb (79.4 kg)   LMP 06/08/2020 (Exact Date)   SpO2 97%   BMI 28.25 kg/m²     INTAKE/OUTPUT:    No intake/output data recorded. No intake/output data recorded. CONSTITUTIONAL:  awake and alert  LUNGS:  Respirations easy and unlabored, clear to auscultation  CARD:  regular rate and rhythm  SKIN: There is some purulent drainage from the abscess site. There is persistent surrounding erythema but the induration is improved    Data:  CBC: Recent Labs     11/27/21  1440 11/28/21  0716   WBC 15.4* 13.0*   HGB 16.0 14.1   HCT 49.8* 43.1    208     BMP:    Recent Labs     11/27/21  1612 11/28/21  0716    137   K 3.8 4.1    104   CO2 23 23   BUN 7 6*   CREATININE 0.8 <0.5*   GLUCOSE 175* 113*     Hepatic:   Recent Labs     11/27/21  1612 11/28/21  0716   AST 12* 19   ALT 14 21   BILITOT 0.3 0.5   ALKPHOS 75 70     Mag:    No results for input(s): MG in the last 72 hours. Phos:     Recent Labs     11/28/21  0716   PHOS 2.4*      INR: No results for input(s): INR in the last 72 hours. Culture review: Too young for identification      ASSESSMENT AND PLAN:  Left buttock cellulitis with abscess, status post incision and drainage. She is clinically improved. Fevers have resolved. Leukocytosis is improved. Continue antibiotics and local wound care.   I suspect this represents a MRSA infection, which will take several weeks for complete resolution     Electronically signed by Adam Bernstein MD     [id number]

## 2021-11-28 NOTE — PROGRESS NOTES
4 Eyes Skin Assessment     The patient is being assess for   Admission    I agree that 2 RN's have performed a thorough Head to Toe Skin Assessment on the patient. ALL assessment sites listed below have been assessed. Areas assessed for pressure by both nurses:   [x]   Head, Face, and Ears   [x]   Shoulders, Back, and Chest, Abdomen  [x]   Arms, Elbows, and Hands   [x]   Coccyx, Sacrum, and Ischium  [x]   Legs, Feet, and Heels        Pt has a quarter 3cm x 3cm abscess on left buttocks, lots of redness and swelling, marked with a pen. Does the Patient have Skin Breakdown related to pressure? No                  Document Information    Clinical Consent:  Wound      11/27/2021 17:26   Attached To:    Hospital Encounter on 11/27/21     Source [id number] [facility], [facility] Emergency Dept              Carlos Prevention initiated:  No   Wound Care Orders initiated:  No      [facility] nurse consulted for Pressure Injury (Stage 3,4, Unstageable, DTI, NWPT, Complex wounds)and New or Established Ostomies:  No      Primary Nurse eSignature: Electronically signed by Anatoliy Joshi RN on 11/28/21 at 6:52 PM EST        Co-signer eSignature: Electronically signed by Zari Dumont RN on 11/28/21 at 6:57 PM EST

## 2021-11-29 LAB
A/G RATIO: 1.2 (ref 1.1–2.2)
ALBUMIN SERPL-MCNC: 3.6 G/DL (ref 3.4–5)
ALP BLD-CCNC: 80 U/L (ref 40–129)
ALT SERPL-CCNC: 33 U/L (ref 10–40)
ANION GAP SERPL CALCULATED.3IONS-SCNC: 10 MMOL/L (ref 3–16)
AST SERPL-CCNC: 23 U/L (ref 15–37)
BASOPHILS ABSOLUTE: 0 K/UL (ref 0–0.2)
BASOPHILS RELATIVE PERCENT: 0.3 %
BILIRUB SERPL-MCNC: 0.3 MG/DL (ref 0–1)
BUN BLDV-MCNC: 6 MG/DL (ref 7–20)
CALCIUM SERPL-MCNC: 8.8 MG/DL (ref 8.3–10.6)
CHLORIDE BLD-SCNC: 105 MMOL/L (ref 99–110)
CO2: 25 MMOL/L (ref 21–32)
CREAT SERPL-MCNC: <0.5 MG/DL (ref 0.6–1.1)
EOSINOPHILS ABSOLUTE: 0.2 K/UL (ref 0–0.6)
EOSINOPHILS RELATIVE PERCENT: 1.7 %
GFR AFRICAN AMERICAN: >60
GFR NON-AFRICAN AMERICAN: >60
GLUCOSE BLD-MCNC: 99 MG/DL (ref 70–99)
HCT VFR BLD CALC: 43.5 % (ref 36–48)
HEMOGLOBIN: 14 G/DL (ref 12–16)
LYMPHOCYTES ABSOLUTE: 1 K/UL (ref 1–5.1)
LYMPHOCYTES RELATIVE PERCENT: 10.7 %
MAGNESIUM: 1.9 MG/DL (ref 1.8–2.4)
MCH RBC QN AUTO: 30.4 PG (ref 26–34)
MCHC RBC AUTO-ENTMCNC: 32.1 G/DL (ref 31–36)
MCV RBC AUTO: 94.8 FL (ref 80–100)
MONOCYTES ABSOLUTE: 0.7 K/UL (ref 0–1.3)
MONOCYTES RELATIVE PERCENT: 7.6 %
NEUTROPHILS ABSOLUTE: 7.5 K/UL (ref 1.7–7.7)
NEUTROPHILS RELATIVE PERCENT: 79.7 %
PDW BLD-RTO: 12.7 % (ref 12.4–15.4)
PHOSPHORUS: 2.8 MG/DL (ref 2.5–4.9)
PLATELET # BLD: 219 K/UL (ref 135–450)
PMV BLD AUTO: 8.7 FL (ref 5–10.5)
POTASSIUM SERPL-SCNC: 3.6 MMOL/L (ref 3.5–5.1)
RBC # BLD: 4.59 M/UL (ref 4–5.2)
SODIUM BLD-SCNC: 140 MMOL/L (ref 136–145)
TOTAL PROTEIN: 6.6 G/DL (ref 6.4–8.2)
WBC # BLD: 9.4 K/UL (ref 4–11)

## 2021-11-29 PROCEDURE — 84100 ASSAY OF PHOSPHORUS: CPT

## 2021-11-29 PROCEDURE — 99024 POSTOP FOLLOW-UP VISIT: CPT | Performed by: NURSE PRACTITIONER

## 2021-11-29 PROCEDURE — 85025 COMPLETE CBC W/AUTO DIFF WBC: CPT

## 2021-11-29 PROCEDURE — 6370000000 HC RX 637 (ALT 250 FOR IP): Performed by: INTERNAL MEDICINE

## 2021-11-29 PROCEDURE — G0378 HOSPITAL OBSERVATION PER HR: HCPCS

## 2021-11-29 PROCEDURE — 36415 COLL VENOUS BLD VENIPUNCTURE: CPT

## 2021-11-29 PROCEDURE — 80053 COMPREHEN METABOLIC PANEL: CPT

## 2021-11-29 PROCEDURE — 99232 SBSQ HOSP IP/OBS MODERATE 35: CPT | Performed by: INTERNAL MEDICINE

## 2021-11-29 PROCEDURE — 96372 THER/PROPH/DIAG INJ SC/IM: CPT

## 2021-11-29 PROCEDURE — 83735 ASSAY OF MAGNESIUM: CPT

## 2021-11-29 PROCEDURE — 1200000000 HC SEMI PRIVATE

## 2021-11-29 PROCEDURE — 6360000002 HC RX W HCPCS: Performed by: INTERNAL MEDICINE

## 2021-11-29 PROCEDURE — 2580000003 HC RX 258: Performed by: INTERNAL MEDICINE

## 2021-11-29 PROCEDURE — 96366 THER/PROPH/DIAG IV INF ADDON: CPT

## 2021-11-29 PROCEDURE — 96376 TX/PRO/DX INJ SAME DRUG ADON: CPT

## 2021-11-29 PROCEDURE — 6370000000 HC RX 637 (ALT 250 FOR IP): Performed by: NURSE PRACTITIONER

## 2021-11-29 RX ORDER — OXYCODONE HYDROCHLORIDE 5 MG/1
5 TABLET ORAL EVERY 4 HOURS PRN
Status: DISCONTINUED | OUTPATIENT
Start: 2021-11-29 | End: 2021-11-30 | Stop reason: HOSPADM

## 2021-11-29 RX ORDER — IBUPROFEN 600 MG/1
600 TABLET ORAL EVERY 6 HOURS PRN
Status: COMPLETED | OUTPATIENT
Start: 2021-11-29 | End: 2021-11-30

## 2021-11-29 RX ORDER — ACETAMINOPHEN, ASPIRIN AND CAFFEINE 250; 250; 65 MG/1; MG/1; MG/1
1 TABLET, FILM COATED ORAL EVERY 6 HOURS PRN
Status: DISCONTINUED | OUTPATIENT
Start: 2021-11-29 | End: 2021-11-30 | Stop reason: HOSPADM

## 2021-11-29 RX ADMIN — CEFEPIME 2000 MG: 2 INJECTION, POWDER, FOR SOLUTION INTRAVENOUS at 05:24

## 2021-11-29 RX ADMIN — IBUPROFEN 400 MG: 400 TABLET, FILM COATED ORAL at 03:23

## 2021-11-29 RX ADMIN — ACETAMINOPHEN, ASPIRIN (NSAID) AND CAFFEINE 1 TABLET: 250; 250; 65 TABLET, FILM COATED ORAL at 12:39

## 2021-11-29 RX ADMIN — IBUPROFEN 400 MG: 400 TABLET, FILM COATED ORAL at 09:37

## 2021-11-29 RX ADMIN — FERROUS SULFATE TAB 325 MG (65 MG ELEMENTAL FE) 325 MG: 325 (65 FE) TAB at 09:16

## 2021-11-29 RX ADMIN — IBUPROFEN 600 MG: 600 TABLET ORAL at 21:12

## 2021-11-29 RX ADMIN — POTASSIUM & SODIUM PHOSPHATES POWDER PACK 280-160-250 MG 250 MG: 280-160-250 PACK at 09:19

## 2021-11-29 RX ADMIN — DOCUSATE SODIUM 100 MG: 100 CAPSULE ORAL at 09:16

## 2021-11-29 RX ADMIN — Medication 10 ML: at 11:30

## 2021-11-29 RX ADMIN — VANCOMYCIN HYDROCHLORIDE 1500 MG: 10 INJECTION, POWDER, LYOPHILIZED, FOR SOLUTION INTRAVENOUS at 11:29

## 2021-11-29 RX ADMIN — CEFEPIME 2000 MG: 2 INJECTION, POWDER, FOR SOLUTION INTRAVENOUS at 17:43

## 2021-11-29 RX ADMIN — ENOXAPARIN SODIUM 40 MG: 100 INJECTION SUBCUTANEOUS at 09:17

## 2021-11-29 RX ADMIN — ONDANSETRON HYDROCHLORIDE 4 MG: 2 INJECTION, SOLUTION INTRAMUSCULAR; INTRAVENOUS at 09:16

## 2021-11-29 ASSESSMENT — PAIN DESCRIPTION - PAIN TYPE: TYPE: ACUTE PAIN

## 2021-11-29 ASSESSMENT — PAIN DESCRIPTION - LOCATION: LOCATION: BUTTOCKS

## 2021-11-29 ASSESSMENT — PAIN SCALES - GENERAL
PAINLEVEL_OUTOF10: 6
PAINLEVEL_OUTOF10: 5
PAINLEVEL_OUTOF10: 6

## 2021-11-29 ASSESSMENT — PAIN DESCRIPTION - DESCRIPTORS: DESCRIPTORS: ACHING

## 2021-11-29 ASSESSMENT — PAIN DESCRIPTION - ORIENTATION: ORIENTATION: LOWER

## 2021-11-29 NOTE — CARE COORDINATION
Case Management Assessment  Initial Evaluation      Patient Name: Kendy Matthew  YOB: 1970  Diagnosis: Cellulitis of buttock [J55.331]  Gluteal abscess [L02.31]  Failure of outpatient treatment [Z78.9]  Date / Time: 11/27/2021  2:07 PM    Admission status/Date: 11/27/21 inpt  Chart Reviewed: Yes      Patient Interviewed: Yes   Family Interviewed:  No      Hospitalization in the last 30 days:  No      Health Care Decision Maker :   Primary Decision Maker: Steven Mena - Spouse - [phone]    (CM - must 1st enter selection under Navigator - emergency contact- [place] Relationship and pick relationship)   Who do you trust or have selected to make healthcare decisions for you      Met with:  patient  Interview conducted  (bedside/phone):    Current PCP:  Makeda Baxter required for SNF : Y          3 night stay required -  [employer]  Support Systems/Care Needs: Spouse/Significant Other, Children  Transportation: self    Meal Preparation: Self    Housing  Living Arrangements:  Lives 2 story home with ability to stay on 1st floor  Steps: [address] [facility] for return to present living arrangements: Yes  Identified Issues:     [address] with [address] : No Agency:(Services)  Type of Home Care Services: None  Passport/Waiver : No  :                      Phone Number:    Passport/Waiver Services: N/A          Durable Medical Equiptment   DME Provider:   Equipment:   Walker___Cane___RTS___ BSC___Shower Chair___Hospital Bed___W/C____Other________  02 at ____Liter(s)---wears(frequency)_______ HHN ___ CPAP___ BiPap___   N/A_x___      Home O2 Use :  No    If No for home O2---if presently on O2 during hospitalization:  No  if yes CM to follow for potential DC O2 need  Informed of need for care provider to bring portable home O2 tank on day of discharge for nursing to connect prior to leaving:   Not Indicated  Verbalized agreement/Understanding:   Not Indicated    Community Service Affiliation  Dialysis:  No    · Agency:  · Location:  · Dialysis Schedule:  · Phone:   · Fax: Other Community Services: (ex:PT/OT,Mental Health,Wound Clinic, Cardio/Pul [address]) None    DISCHARGE PLAN: Explained Case Management role/services. Reviewed chart and spoke with pt and spouse at bedside. Role of CM explained. States lives home with spouse and is IPTA with all care. Discussed poss IVAx and pt is agreeable. Discussed agencies and pt states her meme is a nurse and can be contacted to discuss agencies. S;poke with meme Symone who is aware of poss dc needs. Discussed agencies and meme would like [facility] if needed and for [facility]. Will follow and arrange for dc needs once improves.

## 2021-11-29 NOTE — PROGRESS NOTES
Pharmacy Vancomycin Consult     Vancomycin Day: 2  Current Dosin q18h  Current indication: SSTI    Recent Labs     21  1440 21  1612 21  0716   BUN  --  7 6*   CREATININE  --  0.8 <0.5*   WBC 15.4*  --  13.0*     Ht Readings from Last 1 Encounters:   21 5' 6\" (1.676 m)        Wt Readings from Last 1 Encounters:   21 175 lb (79.4 kg)       Body mass index is 28.25 kg/m². CrCl cannot be calculated (This lab value cannot be used to calculate CrCl because it is not a number: <0.5). Trough: 4.2    Assessment/Plan:  Increase dose and frequency to 1500 mg q12h. Per Pk-insight:   ESI01,RJ: 727 mg/L.hr  Probability of AUC24 > 400: 72 %  Ctrough,ss: 11.3 mg/L  Probability of Ctrough,ss > 20: 4 %  Probability of nephrotoxicity (Lodise MINDY ): 7 %    Next trough:  @ 1030    Pharmacy will continue to monitor.      Hao Bryant PharmD, [facility], 2021 11:27 PM

## 2021-11-29 NOTE — PROGRESS NOTES
PM Shift assessment completed. Pt is alert and oriented. Vital signs are WNL. Respirations are even & easy. No complaints voiced. Pt denies needs at this time. SR up x 2 and bed in low position. Call light is within reach. Will monitor.

## 2021-11-29 NOTE — PROGRESS NOTES
Vancomycin Day: 3    Patient's labs, cultures, vitals, and vancomycin regimen reviewed. No changes today.   Level due o 11/30 at [address] 11/29/20213:28 PM  .

## 2021-11-29 NOTE — FLOWSHEET NOTE
11/29/21 0900   Vital Signs   Temp 97.7 °F (36.5 °C)   Temp Source Oral   Pulse 100   Heart Rate Source Monitor   Resp 16   /74   BP Location Left upper arm   Patient Position Right side   Level of Consciousness Alert (0)   MEWS Score 1   Patient Currently in Pain Yes   Pain Assessment   Pain Assessment 0-10   Pain Level 6   Oxygen Therapy   SpO2 97 %   O2 Device None (Room air)     Pt assessment completed, vss, see flow sheet. Pt c/o 6/10 headache  Pain. Pt  at bed side .   Avery Gutierrez RN

## 2021-11-29 NOTE — PROGRESS NOTES
General Surgery - Meeta Guallpa, APRN - CNP, CNP  Daily Progress Note    Pt Name: Lizzette Johansen  Medical Record Number: [id number]  Date of Birth 1970   Today's Date: 11/29/2021    ASSESSMENT  1. S/p bedside I&D with Dr. Rah Bran on 11/27  2. Left gluteal dressing change: packing removed: small amount of purulent drainage noted, + erythema surrounding I&D site appears improved compared to ER photo. Tenderness is improved per pt. Packed with dry strip gauze and covered with 4x4. Pt tolerated this well. 3. Leuks normal today  4. VSS  5. Wound culture: + MRSA  6. Pt states she \" has a severe headache and feels dizzy with standing. \"     PLAN  1. Cefepime and Vanc  2. Decrease IVF  3. Local wound care  4. Excedrine for HA  5. Hopefully home in the am on [place] with local wound care and f/u in the office. Pt and  agree with the plan of care. Hina Pagan has improved from yesterday. Pain is well controlled. She has nausea and no vomiting. She has passed flatus and has not had a bowel movement. She is tolerating regular diet. Current activity is up with assistance    OBJECTIVE  VITALS:  height is 5' 6\" (1.676 m) and weight is 175 lb (79.4 kg). Her oral temperature is 98.5 °F (36.9 °C). Her blood pressure is 132/73 and her pulse is 70. Her respiration is 16 and oxygen saturation is 97%. VITALS:  /73   Pulse 70   Temp 98.5 °F (36.9 °C) (Oral)   Resp 16   Ht 5' 6\" (1.676 m)   Wt 175 lb (79.4 kg)   LMP 06/08/2020 (Exact Date)   SpO2 97%   BMI 28.25 kg/m²   INTAKE/OUTPUT:    Intake/Output Summary (Last 24 hours) at 11/29/2021 1600  Last data filed at 11/29/2021 0851  Gross per 24 hour   Intake 0 ml   Output --   Net 0 ml     GENERAL: alert, cooperative, no distress  No intake/output data recorded. No intake/output data recorded.     LABS  Recent Labs     11/27/21  1700 11/28/21  0716 11/29/21  0520   WBC  --    < > 9.4   HGB  --    < > 14.0   HCT  --    < > 43.5   PLT  --    < > 219   NA  --    < > 140   K  --    < > 3.6   CL  --    < > 105   CO2  --    < > 25   BUN  --    < > 6*   CREATININE  --    < > <0.5*   MG  --   --  1.90   PHOS  --    < > 2.8   CALCIUM  --    < > 8.8   AST  --    < > 23   ALT  --    < > 33   BILITOT  --    < > 0.3   NITRU Negative  --   --    COLORU Yellow  --   --    BACTERIA 1+*  --   --     < > = values in this interval not displayed.      CBC with Differential:    Lab Results   Component Value Date    WBC 9.4 11/29/2021    RBC 4.59 11/29/2021    HGB 14.0 11/29/2021    HCT 43.5 11/29/2021     11/29/2021    MCV 94.8 11/29/2021    MCH 30.4 11/29/2021    MCHC 32.1 11/29/2021    RDW 12.7 11/29/2021    LYMPHOPCT 10.7 11/29/2021    MONOPCT 7.6 11/29/2021    BASOPCT 0.3 11/29/2021    MONOSABS 0.7 11/29/2021    LYMPHSABS 1.0 11/29/2021    EOSABS 0.2 11/29/2021    BASOSABS 0.0 11/29/2021     CMP:    Lab Results   Component Value Date     11/29/2021    K 3.6 11/29/2021    K 3.8 11/27/2021     11/29/2021    CO2 25 11/29/2021    BUN 6 11/29/2021    CREATININE <0.5 11/29/2021    GFRAA >60 11/29/2021    AGRATIO 1.2 11/29/2021    LABGLOM >60 11/29/2021    GLUCOSE 99 11/29/2021    PROT 6.6 11/29/2021    LABALBU 3.6 11/29/2021    CALCIUM 8.8 11/29/2021    BILITOT 0.3 11/29/2021    ALKPHOS 80 11/29/2021    AST 23 11/29/2021    ALT 33 11/29/2021         OPAL Kramer - CNP  Electronically signed 11/29/2021 at 3:54 PM

## 2021-11-29 NOTE — PROGRESS NOTES
Progress Note    Admit Date:  11/27/2021    Admitted with left gluteal abscess. Seen by general surgery, s/p I&D    Subjective:  Ms. Prasanna Dunn complains of nausea and headache this am, improved after emesis  No diarrhea  Pain improving slowly     Objective:   /74   Pulse 100   Temp 97.7 °F (36.5 °C) (Oral)   Resp 16   Ht 5' 6\" (1.676 m)   Wt 175 lb (79.4 kg)   LMP 06/08/2020 (Exact Date)   SpO2 97%   BMI 28.25 kg/m²       Intake/Output Summary (Last 24 hours) at 11/29/2021 1357  Last data filed at 11/29/2021 0851  Gross per 24 hour   Intake 0 ml   Output --   Net 0 ml         Physical Exam:  General:  Awake, alert, NAD. Well oriented  Ill-appearing  Skin:  Warm and dry  Neck:  JVD absent. Neck supple  Chest:  Clear to auscultation, respiration easy. No wheezes, rales or rhonchi. Cardiovascular:  RRR ,S1S2 normal  Abdomen:  Soft, non tender, non distended, BS +  Left buttock area, I&D site open but not much drainage, surrounding erythema present, tender and firm  Extremities:  No edema. Intact peripheral pulses.  Brisk cap refill, < 2 secs  Neuro: non focal      Medications:   Scheduled Meds:   vancomycin  1,500 mg IntraVENous Q12H    docusate sodium  100 mg Oral BID    ferrous sulfate  325 mg Oral BID    cefepime  2,000 mg IntraVENous Q12H    sodium chloride flush  5-40 mL IntraVENous 2 times per day    enoxaparin  40 mg SubCUTAneous Daily       Continuous Infusions:   sodium chloride 75 mL/hr at 11/28/21 1755    sodium chloride         Data:  CBC:   Recent Labs     11/27/21  1440 11/28/21  0716 11/29/21  0520   WBC 15.4* 13.0* 9.4   RBC 5.27* 4.55 4.59   HGB 16.0 14.1 14.0   HCT 49.8* 43.1 43.5   MCV 94.6 94.7 94.8   RDW 12.9 12.7 12.7    208 219     BMP:   Recent Labs     11/27/21  1612 11/28/21  0716 11/29/21  0520    137 140   K 3.8 4.1 3.6    104 105   CO2 23 23 25   PHOS  --  2.4* 2.8   BUN 7 6* 6*   CREATININE 0.8 <0.5* <0.5*     BNP: No results for input(s): BNP in the last 72 hours. PT/INR: No results for input(s): PROTIME, INR in the last 72 hours. APTT: No results for input(s): APTT in the last 72 hours. CARDIAC ENZYMES: No results for input(s): CKMB, CKMBINDEX, TROPONINI in the last 72 hours. Invalid input(s): CKTOTAL;3  FASTING LIPID PANEL:No results found for: CHOL, HDL, TRIG  LIVER PROFILE:   Recent Labs     11/27/21  1612 11/28/21  0716 11/29/21  0520   AST 12* 19 23   ALT 14 21 33   BILITOT 0.3 0.5 0.3   ALKPHOS 75 70 80            Assessment and PLan    Sepsis. Left Gluteal abscess with cellulitis   - Failed OP bactrim therapy -   -treated with IV vanc and cefepime. Day #3  -General surgery consulted, s/p I&D. Cultures with MRSA , blood cx neg   White count improved from 15--> 9  No fevers ,     Can dc with clindamycin in  1- 2 days          DVT Prophylaxis: lovenox  Diet: ADULT DIET; Regular  Code Status: Full Code        Dispo - inpatient.        Dolores Whitney MD, MD 11/29/2021 1:57 PM

## 2021-11-29 NOTE — PLAN OF CARE
Problem: SAFETY  Goal: Free from accidental physical injury  11/29/2021 1322 by Maria Teresa Quintanilla RN  Outcome: Ongoing  11/29/2021 0040 by Laz Ferguson RN  Outcome: Ongoing  Goal: Free from intentional harm  11/29/2021 1322 by Maria Teresa Quintanilla RN  Outcome: Ongoing  11/29/2021 0040 by Laz Ferguson RN  Outcome: Ongoing     Problem: DAILY CARE  Goal: Daily care needs are met  11/29/2021 1322 by Maria Teresa Quintanilla RN  Outcome: Ongoing  11/29/2021 0040 by Laz Ferguson RN  Outcome: Ongoing     Problem: PAIN  Goal: Patient's pain/discomfort is manageable  11/29/2021 1322 by Maria Teresa Quintanilla RN  Outcome: Ongoing  11/29/2021 0040 by Laz Ferguson RN  Outcome: Ongoing     Problem: SKIN INTEGRITY  Goal: Skin integrity is maintained or improved  11/29/2021 1322 by Maria Teresa Quintanilla RN  Outcome: Ongoing  11/29/2021 0040 by Laz Ferguson RN  Outcome: Ongoing     Problem: KNOWLEDGE DEFICIT  Goal: Patient/S.O. demonstrates understanding of disease process, treatment plan, medications, and discharge instructions.   11/29/2021 1322 by Maria Teresa Quintanilla RN  Outcome: Ongoing  11/29/2021 0040 by Laz Ferguson RN  Outcome: Ongoing     Problem: DISCHARGE BARRIERS  Goal: Patient's continuum of care needs are met  11/29/2021 1322 by Maria Teresa Quintanilla RN  Outcome: Ongoing  11/29/2021 0040 by Laz Ferguson RN  Outcome: Ongoing     Problem: Pain:  Goal: Pain level will decrease  Description: Pain level will decrease  11/29/2021 1322 by Maria Teresa Quintanilla RN  Outcome: Ongoing  11/29/2021 0040 by Laz Ferguson RN  Outcome: Ongoing  Goal: Control of acute pain  Description: Control of acute pain  11/29/2021 1322 by Maria Teresa Quintanilla RN  Outcome: Ongoing  11/29/2021 0040 by Laz Ferguson RN  Outcome: Ongoing  Goal: Control of chronic pain  Description: Control of chronic pain  11/29/2021 1322 by Maria Teresa Quintanilla RN  Outcome: Ongoing  11/29/2021 0040 by Laz Ferguson RN  Outcome: Ongoing

## 2021-11-30 VITALS
HEIGHT: 66 IN | RESPIRATION RATE: 16 BRPM | SYSTOLIC BLOOD PRESSURE: 146 MMHG | WEIGHT: 175 LBS | TEMPERATURE: 97.9 F | DIASTOLIC BLOOD PRESSURE: 81 MMHG | HEART RATE: 71 BPM | BODY MASS INDEX: 28.12 KG/M2 | OXYGEN SATURATION: 97 %

## 2021-11-30 PROBLEM — L02.31 GLUTEAL ABSCESS: Status: RESOLVED | Noted: 2021-11-27 | Resolved: 2021-11-30

## 2021-11-30 LAB
A/G RATIO: 1.2 (ref 1.1–2.2)
ALBUMIN SERPL-MCNC: 3.4 G/DL (ref 3.4–5)
ALP BLD-CCNC: 74 U/L (ref 40–129)
ALT SERPL-CCNC: 27 U/L (ref 10–40)
ANION GAP SERPL CALCULATED.3IONS-SCNC: 10 MMOL/L (ref 3–16)
AST SERPL-CCNC: 15 U/L (ref 15–37)
BASOPHILS ABSOLUTE: 0.1 K/UL (ref 0–0.2)
BASOPHILS RELATIVE PERCENT: 0.8 %
BILIRUB SERPL-MCNC: <0.2 MG/DL (ref 0–1)
BUN BLDV-MCNC: 8 MG/DL (ref 7–20)
CALCIUM SERPL-MCNC: 9 MG/DL (ref 8.3–10.6)
CHLORIDE BLD-SCNC: 104 MMOL/L (ref 99–110)
CO2: 24 MMOL/L (ref 21–32)
CREAT SERPL-MCNC: <0.5 MG/DL (ref 0.6–1.1)
EOSINOPHILS ABSOLUTE: 0.2 K/UL (ref 0–0.6)
EOSINOPHILS RELATIVE PERCENT: 2.3 %
GFR AFRICAN AMERICAN: >60
GFR NON-AFRICAN AMERICAN: >60
GLUCOSE BLD-MCNC: 111 MG/DL (ref 70–99)
GRAM STAIN RESULT: ABNORMAL
HCT VFR BLD CALC: 42.1 % (ref 36–48)
HEMOGLOBIN: 13.6 G/DL (ref 12–16)
LYMPHOCYTES ABSOLUTE: 1.1 K/UL (ref 1–5.1)
LYMPHOCYTES RELATIVE PERCENT: 14.6 %
MCH RBC QN AUTO: 30.7 PG (ref 26–34)
MCHC RBC AUTO-ENTMCNC: 32.3 G/DL (ref 31–36)
MCV RBC AUTO: 95.1 FL (ref 80–100)
MONOCYTES ABSOLUTE: 0.6 K/UL (ref 0–1.3)
MONOCYTES RELATIVE PERCENT: 7.5 %
NEUTROPHILS ABSOLUTE: 5.6 K/UL (ref 1.7–7.7)
NEUTROPHILS RELATIVE PERCENT: 74.8 %
ORGANISM: ABNORMAL
PDW BLD-RTO: 12.5 % (ref 12.4–15.4)
PLATELET # BLD: 236 K/UL (ref 135–450)
PMV BLD AUTO: 8.5 FL (ref 5–10.5)
POTASSIUM SERPL-SCNC: 3.9 MMOL/L (ref 3.5–5.1)
RBC # BLD: 4.43 M/UL (ref 4–5.2)
SODIUM BLD-SCNC: 138 MMOL/L (ref 136–145)
TOTAL PROTEIN: 6.3 G/DL (ref 6.4–8.2)
VANCOMYCIN TROUGH: 12.1 UG/ML (ref 10–20)
WBC # BLD: 7.4 K/UL (ref 4–11)
WOUND/ABSCESS: ABNORMAL

## 2021-11-30 PROCEDURE — 96372 THER/PROPH/DIAG INJ SC/IM: CPT

## 2021-11-30 PROCEDURE — 36415 COLL VENOUS BLD VENIPUNCTURE: CPT

## 2021-11-30 PROCEDURE — 96366 THER/PROPH/DIAG IV INF ADDON: CPT

## 2021-11-30 PROCEDURE — 2580000003 HC RX 258: Performed by: NURSE PRACTITIONER

## 2021-11-30 PROCEDURE — 6370000000 HC RX 637 (ALT 250 FOR IP): Performed by: INTERNAL MEDICINE

## 2021-11-30 PROCEDURE — 85025 COMPLETE CBC W/AUTO DIFF WBC: CPT

## 2021-11-30 PROCEDURE — 80053 COMPREHEN METABOLIC PANEL: CPT

## 2021-11-30 PROCEDURE — 99024 POSTOP FOLLOW-UP VISIT: CPT | Performed by: NURSE PRACTITIONER

## 2021-11-30 PROCEDURE — 2580000003 HC RX 258: Performed by: INTERNAL MEDICINE

## 2021-11-30 PROCEDURE — G0378 HOSPITAL OBSERVATION PER HR: HCPCS

## 2021-11-30 PROCEDURE — 80202 ASSAY OF VANCOMYCIN: CPT

## 2021-11-30 PROCEDURE — 6360000002 HC RX W HCPCS: Performed by: INTERNAL MEDICINE

## 2021-11-30 PROCEDURE — 96376 TX/PRO/DX INJ SAME DRUG ADON: CPT

## 2021-11-30 RX ORDER — ONDANSETRON 4 MG/1
4 TABLET, ORALLY DISINTEGRATING ORAL EVERY 8 HOURS PRN
Qty: 20 TABLET | Refills: 0 | Status: SHIPPED | OUTPATIENT
Start: 2021-11-30

## 2021-11-30 RX ORDER — CLINDAMYCIN HYDROCHLORIDE 300 MG/1
300 CAPSULE ORAL 3 TIMES DAILY
Qty: 30 CAPSULE | Refills: 0 | Status: SHIPPED | OUTPATIENT
Start: 2021-11-30 | End: 2021-12-10

## 2021-11-30 RX ADMIN — VANCOMYCIN HYDROCHLORIDE 1500 MG: 10 INJECTION, POWDER, LYOPHILIZED, FOR SOLUTION INTRAVENOUS at 11:19

## 2021-11-30 RX ADMIN — IBUPROFEN 600 MG: 600 TABLET ORAL at 05:16

## 2021-11-30 RX ADMIN — ENOXAPARIN SODIUM 40 MG: 100 INJECTION SUBCUTANEOUS at 07:49

## 2021-11-30 RX ADMIN — DOCUSATE SODIUM 100 MG: 100 CAPSULE ORAL at 07:49

## 2021-11-30 RX ADMIN — SODIUM CHLORIDE: 9 INJECTION, SOLUTION INTRAVENOUS at 11:04

## 2021-11-30 RX ADMIN — Medication 10 ML: at 07:49

## 2021-11-30 RX ADMIN — IBUPROFEN 600 MG: 600 TABLET ORAL at 11:06

## 2021-11-30 RX ADMIN — ONDANSETRON HYDROCHLORIDE 4 MG: 2 INJECTION, SOLUTION INTRAMUSCULAR; INTRAVENOUS at 07:49

## 2021-11-30 RX ADMIN — CEFEPIME 2000 MG: 2 INJECTION, POWDER, FOR SOLUTION INTRAVENOUS at 05:14

## 2021-11-30 RX ADMIN — VANCOMYCIN HYDROCHLORIDE 1500 MG: 10 INJECTION, POWDER, LYOPHILIZED, FOR SOLUTION INTRAVENOUS at 00:19

## 2021-11-30 ASSESSMENT — PAIN DESCRIPTION - DESCRIPTORS: DESCRIPTORS: DISCOMFORT

## 2021-11-30 ASSESSMENT — PAIN SCALES - GENERAL
PAINLEVEL_OUTOF10: 2
PAINLEVEL_OUTOF10: 5
PAINLEVEL_OUTOF10: 5

## 2021-11-30 ASSESSMENT — PAIN DESCRIPTION - ORIENTATION: ORIENTATION: LOWER

## 2021-11-30 ASSESSMENT — PAIN DESCRIPTION - LOCATION: LOCATION: BUTTOCKS

## 2021-11-30 NOTE — PLAN OF CARE
Problem: SAFETY  Goal: Free from accidental physical injury  11/30/2021 0905 by Rigoberto Spatz, RN  Outcome: Ongoing  11/30/2021 0208 by Asim Gallego RN  Outcome: Ongoing  Goal: Free from intentional harm  Outcome: Ongoing     Problem: DAILY CARE  Goal: Daily care needs are met  Outcome: Ongoing     Problem: PAIN  Goal: Patient's pain/discomfort is manageable  11/30/2021 0905 by Rigoberto Spatz, RN  Outcome: Ongoing  11/30/2021 0208 by Asim Gallego RN  Outcome: Ongoing     Problem: SKIN INTEGRITY  Goal: Skin integrity is maintained or improved  Outcome: Ongoing     Problem: KNOWLEDGE DEFICIT  Goal: Patient/S.O. demonstrates understanding of disease process, treatment plan, medications, and discharge instructions.   Outcome: Ongoing     Problem: DISCHARGE BARRIERS  Goal: Patient's continuum of care needs are met  Outcome: Ongoing     Problem: Pain:  Goal: Pain level will decrease  Description: Pain level will decrease  Outcome: Ongoing  Goal: Control of acute pain  Description: Control of acute pain  Outcome: Ongoing  Goal: Control of chronic pain  Description: Control of chronic pain  Outcome: Ongoing

## 2021-11-30 NOTE — PLAN OF CARE
Problem: SAFETY  Goal: Free from accidental physical injury  11/30/2021 0208 by Zina Majano RN  Outcome: Ongoing     Problem: PAIN  Goal: Patient's pain/discomfort is manageable  11/30/2021 0208 by Zina Majano RN  Outcome: Ongoing

## 2021-11-30 NOTE — FLOWSHEET NOTE
11/29/21 2024   Vitals   Temp 97.9 °F (36.6 °C)   Temp Source Oral   Pulse 83   Heart Rate Source Monitor   Resp 18   /70   BP Location Left upper arm   BP Upper/Lower Upper   BP Method Automatic   Patient Position Semi fowlers   Pain Assessment   Pain Assessment 0-10   Pain Level 6   Pain Type Acute pain   Pain Location Buttocks   Pain Descriptors Aching   Patient's Stated Pain Goal 4   Pain Orientation Lower   Oxygen Therapy   SpO2 97 %   O2 Device None (Room air)   pt requesting ibuprofen order was d/C today when Excedrin migraine was started, pt report was sick after taking the Excedrin migraine , and does want oxycodone d/T way make her feel pt. pain  is now 6/10 to buttocks perfect serve sent to MD.

## 2021-11-30 NOTE — ED PROVIDER NOTES
I independently interviewed, examined and evaluated Maryana Patten. In brief, this is a 52-year-old female presenting with abscess to her buttock. She states that the area started as a pimple 2 days ago, she went to urgent care yesterday was given Bactrim however the redness and swelling has increased today. She also describes fever, body aches and chills at home and therefore came to the ED for further evaluation. On exam, she does have an area as pictured below to her left buttock with induration, mild fluctuance, and erythema. There is no crepitus. ED course: This is a 52-year-old female presenting with left buttock pain. She is febrile and tachycardic on arrival.  Nontoxic-appearing on exam.  She does have a cellulitis to her left buttock, potentially an abscess. Given her picture of sepsis, she will be admitted for further treatment with IV antibiotics. She is a leukocytosis of 13, blood cultures were sent. Started on vancomycin and cefepime. She was admitted to hospitalist service. All diagnostic, treatment, and disposition decisions were made by myself in conjunction with the advanced practice provider. For all further details of the patient's emergency department visit, please see the advanced practice provider's documentation. Comment: Please note this report has been produced using speech recognition software and may contain errors related to that system including errors in grammar, punctuation, and spelling, as well as words and phrases that may be inappropriate. If there are any questions or concerns please feel free to contact the dictating provider for clarification.          Dimas [place]  11/29/21 5829

## 2021-11-30 NOTE — PROGRESS NOTES
Patient left via w/c per transport to private car. No s/s distress noted. No voiced concerns. resp e/e unlabored.

## 2021-11-30 NOTE — PROGRESS NOTES
Discharge paperwork given to patient and wound care supplies. Meds to beds provided. SL to LFA removed. Tip intact, dressing applied, tolerated well. Verbalized understanding of wound care and discharge instructions. VSS. No s/s distress noted. Awaiting for transport.

## 2021-11-30 NOTE — DISCHARGE INSTR - COC
Continuity of Care Form    Patient Name: Tra Hay   :  1970  MRN:  [id number]    Admit date:  2021  Discharge date:  ***    Code Status Order: Full Code   Advance Directives:      Admitting Physician:  Rafaela Garcia MD  PCP: MEG Waddell    Discharging Nurse: [facility] Unit/Room#: 5022/5444-20  Discharging Unit Phone Number: ***    Emergency Contact:   Extended Emergency Contact Information  Primary Emergency Contact: Colletta Gallery  Address: [address] [place] [address] Phone: [phone]  Work Phone: [phone]  Relation: Spouse  Secondary Emergency Contact: [address], [address] Phone: [phone]  Relation: Child    Past Surgical History:  Past Surgical History:   Procedure Laterality Date    HYSTERECTOMY, VAGINAL N/A 2020    LAPAROSCOPIC ASSISTED VAGINAL HYSTERECTOMY WITH BILATERAL SALPINGECTOMY AND VIDEO CYSTOSCOPY  CPT CODE - 49677, 99957, 96344, 40852 performed by Cee Castillo DO at [facility] EXTRACTION         Immunization History: There is no immunization history on file for this patient.     Active Problems:  Patient Active Problem List   Diagnosis Code    Intramural leiomyoma of uterus D25.1    Cystocele, midline N81.11    Pelvic pain R10.2    Abnormal uterine bleeding N93.9    S/P laparoscopic assisted vaginal hysterectomy (LAVH) Z90.710    Severe anemia D64.9    S/P laparoscopic assisted vaginal hysterectomy (LAVH) Z90.710    Gluteal abscess L02.31    Cellulitis of buttock L03.317    Failure of outpatient treatment Z78.9    Leukemoid reaction D72.823       Isolation/Infection:   Isolation            Contact          Patient Infection Status       Infection Onset Added Last Indicated Last Indicated By Review Planned Expiration Resolved Resolved By    MRSA 21 Culture, Wound                Nurse Assessment:  Last Vital Signs: /70   Pulse 83   Temp 97.9 °F (36.6 °C) (Oral)   Resp 18   Ht 5' 6\" (1.676 m)   Wt 175 lb (79.4 kg)   LMP 2020 (Exact Date)   SpO2 97%   BMI 28.25 kg/m²     Last documented pain score (0-10 scale): Pain Level: 6  Last Weight:   Wt Readings from Last 1 Encounters:   21 175 lb (79.4 kg)     Mental Status:  {IP PT MENTAL STATUS:}    IV Access:  { GINA IV ACCESS:597586244}    Nursing Mobility/ADLs:  Walking   {P DME QCGB:558574328}  Transfer  {P DME SPAU:736438361}  Bathing  {CHP DME EMOM:036436004}  Dressing  {P DME DFJC:862753672}  Toileting  {P DME ILEI:567370691}  Feeding  {[facility] DME KTUN:393597668}  Med Admin  {[facility] DME KRR}  Med Delivery   { GINA MED Delivery:300586503}    Wound Care Documentation and Therapy:  Wound 21 Buttocks Left (Active)   Dressing Status Old drainage noted 21   Dressing/Treatment ABD; Other (comment) 21   Wound Length (cm) 3 cm 21 180   Wound Width (cm) 3 cm 21   Wound Surface Area (cm^2) 9 cm^2 21 180   Drainage Amount Small 21   Drainage Description Serosanguinous 21   Odor None 21   Mary-wound Assessment Blanchable erythema; Warm; Other (Comment) 21   Number of days: 1        Elimination:  Continence: Bowel: {YES / LM:63556}  Bladder: {YES / KO:50024}  Urinary Catheter: {Urinary Catheter:536473095}   Colostomy/Ileostomy/Ileal Conduit: {YES / RANDLE:17175}       Date of Last BM: ***    Intake/Output Summary (Last 24 hours) at 2021 2353  Last data filed at 2021 0851  Gross per 24 hour   Intake 0 ml   Output --   Net 0 ml     No intake/output data recorded.     Safety Concerns:     508 ITema Safety Concerns:455800114}    Impairments/Disabilities:      508 ITema Impairments/Disabilities:541263134}    Nutrition Therapy:  Current Nutrition Therapy:   508 ITema Diet List:659911997}    Routes of Feeding: {CHP DME Other Feedings:344616700}  Liquids: {Slp liquid thickness:95907}  Daily Fluid Restriction: {CHP DME Yes amt example:181993254}  Last Modified Barium Swallow with Video (Video Swallowing Test): {Done Not Done [facility]:938787982}    Treatments at the Time of Hospital Discharge:   Respiratory Treatments: ***  Oxygen Therapy:  {Therapy; copd oxygen:49319}  Ventilator:    {[facility] Vent DWBF:521854730}    Rehab Therapies: {THERAPEUTIC INTERVENTION:6216421095}  Weight Bearing Status/Restrictions: {[facility] Weight Bearin}  Other Medical Equipment (for information only, NOT a DME order):  {EQUIPMENT:488233137}  Other Treatments: ***    Patient's personal belongings (please select all that are sent with patient):  {P DME Belongings:949961531}    RN SIGNATURE:  {Esignature:630159755}    CASE MANAGEMENT/SOCIAL WORK SECTION    Inpatient Status Date: ***    Readmission Risk Assessment Score:  Readmission Risk              Risk of Unplanned Readmission:  5           Discharging to Facility/ Agency   Name:   Address:  Phone:  Fax:    Dialysis Facility (if applicable)   Name:  Address:  Dialysis Schedule:  Phone:  Fax:    / signature: {Esignature:755284461}    PHYSICIAN SECTION    Prognosis: {Prognosis:7813141195}    Condition at Discharge: [address] [place] [address] Patient Condition:123603670}    Rehab Potential (if transferring to Rehab): {Prognosis:8543647308}    Recommended Labs or Other Treatments After Discharge: ***    Physician Certification: I certify the above information and transfer of Dore Merlin  is necessary for the continuing treatment of the diagnosis listed and that she requires {Admit to Appropriate Level of Care:83711} for {GREATER/LESS:370073121} 30 days.      Update Admission H&P: {CHP DME Changes in GAVFB:547792883}    PHYSICIAN SIGNATURE:  {Esignature:290942273}

## 2021-11-30 NOTE — FLOWSHEET NOTE
11/30/21 0739   Vital Signs   Temp 97.8 °F (36.6 °C)   Temp Source Oral   Pulse 76   Heart Rate Source Monitor   Resp 18   /71   BP Location Right upper arm   Patient Position Semi fowlers   Level of Consciousness Alert (0)   MEWS Score 1   Patient Currently in Pain Denies   Pain Assessment   RASS Score 0   POSS Score (Patient Ctrl Analgesia) Awake and Alert   Oxygen Therapy   SpO2 95 %   O2 Device None (Room air)     Alert and oriented. Skin w/d. Resp e/e unlabored. Nursing asmt completed. Dressing to left gluteal fold with scan amount of dry drainage reinforced. Continues contact isolation. Call light in easy reach. Whiteboard updated.

## 2021-11-30 NOTE — FLOWSHEET NOTE
11/30/21 1324   Vital Signs   Temp 97.9 °F (36.6 °C)   Temp Source Oral   Pulse 71   Heart Rate Source Monitor   Resp 16   BP (!) 146/81   BP Location Left upper arm   Patient Position High fowlers   Level of Consciousness Alert (0)   MEWS Score 1   Patient Currently in Pain Denies   Pain Assessment   RASS Score 0   Response to Pain Intervention Patient Satisfied   POSS Score (Patient Ctrl Analgesia) Awake and Alert   Oxygen Therapy   SpO2 97 %   O2 Device None (Room air)     Discharge VS

## 2021-12-01 LAB
BLOOD CULTURE, ROUTINE: NORMAL
CULTURE, BLOOD 2: NORMAL

## 2021-12-14 ENCOUNTER — OFFICE VISIT (OUTPATIENT)
Dept: SURGERY | Age: 51
End: 2021-12-14
Payer: COMMERCIAL

## 2021-12-14 VITALS
BODY MASS INDEX: 28.77 KG/M2 | SYSTOLIC BLOOD PRESSURE: 124 MMHG | DIASTOLIC BLOOD PRESSURE: 78 MMHG | WEIGHT: 179 LBS | HEIGHT: 66 IN

## 2021-12-14 DIAGNOSIS — L02.31 CUTANEOUS ABSCESS OF BUTTOCK: Primary | ICD-10-CM

## 2021-12-14 PROCEDURE — 1111F DSCHRG MED/CURRENT MED MERGE: CPT | Performed by: SURGERY

## 2021-12-14 PROCEDURE — 1036F TOBACCO NON-USER: CPT | Performed by: SURGERY

## 2021-12-14 PROCEDURE — G8427 DOCREV CUR MEDS BY ELIG CLIN: HCPCS | Performed by: SURGERY

## 2021-12-14 PROCEDURE — G8419 CALC BMI OUT NRM PARAM NOF/U: HCPCS | Performed by: SURGERY

## 2021-12-14 PROCEDURE — 3017F COLORECTAL CA SCREEN DOC REV: CPT | Performed by: SURGERY

## 2021-12-14 PROCEDURE — 99212 OFFICE O/P EST SF 10 MIN: CPT | Performed by: SURGERY

## 2021-12-14 PROCEDURE — G8484 FLU IMMUNIZE NO ADMIN: HCPCS | Performed by: SURGERY

## 2021-12-14 NOTE — PROGRESS NOTES
[facility]    CHIEF COMPLAINT: Buttocks abscess    SUBJECTIVE:   Patient presents for follow up of her buttocks abscess. She reports it is doing well. The pain is gone. She has minimal drainage. It has healed to the point that they can no longer pack it.     Allergies   Allergen Reactions    Penicillins      Reaction unknown, as a child, pt does not remember what happened     No outpatient medications have been marked as taking for the 12/14/21 encounter (Office Visit) with Jody Cerda MD.       Past Medical History:   Diagnosis Date    Abnormal Pap smear of cervix     Abnormal uterine bleeding (AUB)     Menopausal symptoms     MRSA (methicillin resistant staph aureus) culture positive 11/27/2021    SKIN    PONV (postoperative nausea and vomiting)     Severe anemia 06/19/2020    Urinary incontinence      Past Surgical History:   Procedure Laterality Date    HYSTERECTOMY, VAGINAL N/A 6/18/2020    LAPAROSCOPIC ASSISTED VAGINAL HYSTERECTOMY WITH BILATERAL SALPINGECTOMY AND VIDEO CYSTOSCOPY  CPT CODE - 60736, 99155, 82606, 73275 performed by Jaunita Spatz, DO at [address] [place] [address] EXTRACTION       Family History   Problem Relation Age of Onset    Heart Attack Paternal Grandfather     Dementia Paternal Grandmother     Osteoporosis Maternal Grandmother     No Known Problems Maternal Grandfather     Cancer Mother      Social History     Socioeconomic History    Marital status:      Spouse name: Not on file    Number of children: Not on file    Years of education: Not on file    Highest education level: Not on file   Occupational History    Not on file   Tobacco Use    Smoking status: Never Smoker    Smokeless tobacco: Never Used   Vaping Use    Vaping Use: Never used   Substance and Sexual Activity    Alcohol use: Yes     Comment: socially    Drug use: No    Sexual activity: Yes     Partners: Male   Other Topics Concern    Not on file Social History Narrative    Not on file     Social Determinants of Health     Financial Resource Strain:     Difficulty of Paying Living Expenses: Not on file   Food Insecurity:     Worried About Running Out of Food in the Last Year: Not on file    Heaven of Food in the Last Year: Not on file   Transportation Needs:     Lack of Transportation (Medical): Not on file    Lack of Transportation (Non-Medical): Not on file   Physical Activity:     Days of Exercise per Week: Not on file    Minutes of Exercise per Session: Not on file   Stress:     Feeling of Stress : Not on file   Social Connections:     Frequency of Communication with Friends and Family: Not on file    Frequency of Social Gatherings with Friends and Family: Not on file    Attends Synagogue Services: Not on file    Active Member of [address] [place] [address] [employer] or Organizations: Not on file    Attends Club or Organization Meetings: Not on file    Marital Status: Not on file   Intimate Partner Violence:     Fear of Current or Ex-Partner: Not on file    Emotionally Abused: Not on file    Physically Abused: Not on file    Sexually Abused: Not on file   Housing Stability:     Unable to Pay for Housing in the Last Year: Not on file    Number of [place] in the Last Year: Not on file    Unstable Housing in the Last Year: Not on file          Vitals:    12/14/21 1010   BP: 124/78   Site: Left Upper Arm   Position: Sitting   Cuff Size: Large Adult   Weight: 179 lb (81.2 kg)   Height: 5' 6\" (1.676 m)     Body mass index is 28.89 kg/m². ROS:  As per HPI, otherwise reviewed and negative    PHYSICAL EXAM:     Constitutional:  Well developed, well nourished, no acute distress, non-toxic appearance   Respiratory:  No respiratory distress, normal breath sounds, no rales, no wheezing   Cardiovascular:  Normal rate, normal rhythm, no murmurs. Integument: Left buttock with resolved induration.   The wound has closed down to just a small superficial cutaneous open area 1.2 x 0.8 x 0.2 cm. The base is clean and granulating  Neurologic:  Alert & oriented x 3, normal motor function, normal sensory function, no focal deficits noted   Psychiatric:  Speech and behavior appropriate             DATA:  N/A    ASSESSMENT:   1. Cutaneous abscess of buttock           PLAN: Her buttocks wound is healing nicely. There is no sign of residual abscess. The induration has resolved. Continue local wound care.   Follow-up as needed

## 2021-12-20 NOTE — DISCHARGE SUMMARY
GENERAL SURGERY  Discharge Summary      Pt Name:Shraddha Garcia  MRN: [id number]  YOB: 1970  Primary Care Physician: MEG Russell  Admit date:  11/27/2021  2:07 PM  Discharge date:  11/30/2021  2:23 PM  Disposition: home  Admitting Diagnosis:   1. Cellulitis of buttock    2. Failure of outpatient treatment      Discharge Diagnosis:   Patient Active Problem List   Diagnosis Code    Intramural leiomyoma of uterus D25.1    Cystocele, midline N81.11    Pelvic pain R10.2    Abnormal uterine bleeding N93.9    S/P laparoscopic assisted vaginal hysterectomy (LAVH) Z90.710    Severe anemia D64.9    S/P laparoscopic assisted vaginal hysterectomy (LAVH) Z90.710    Cellulitis of buttock L03.317     Consultants:  Hospitalist   Procedures/Diagnostic Test:  Bedside incision and drainage of left buttocks abscess   Hospital Course: Raman Cabrera originally presented to the hospital on 11/27/2021  2:07 PM with a four day history of a left buttock area which was painful and swollen with associated fevers. The redness increased. The patient came to the Er for evaluation. In the ER, the patient was noted to have a leukocytosis. We were consulted to evaluate this patient's gluteal abscess. After evaluation, it was recommended by Dr. Tye Keller the patient undergo a bedside incision and drainage of the left gluteal abscess. All the risks, benefits and alternatives were discussed with the patient and she agreed to proceed with the procedure. Postoperatively, the patient's gluteal pain and redness improved. She was afebrile and her leukocytosis was improved. The wound was + MRSA. As a result, she was cleared for discharge to home in stable condition with home dressing changes: this was demonstrated to pt  at the bedside and he verbalized ability to change the dressing.      At time of discharge, Raman Cabrera was tolerating a regular diet, having bowel movements,ambulating on her own accord and had adequate analgesia on oral pain medications, and had no signs of symptoms of complications. PHYSICAL EXAMINATION   Discharge Vitals:  height is 5' 6\" (1.676 m) and weight is 175 lb (79.4 kg). Her oral temperature is 97.9 °F (36.6 °C). Her blood pressure is 146/81 (abnormal) and her pulse is 71. Her respiration is 16 and oxygen saturation is 97%. General appearance - alert, well appearing, and in no distress  Neurological - motor and sensory grossly normal bilaterally  Musculoskeletal - full range of motion without pain  Extremities -  no pedal edema, no clubbing or cyanosis  Incision: healing well, improved erythema, improved tenderness, draining adequately. Packed with wet-to-dry and covered with dry gauze. LABS   No results for input(s): WBC, HGB, HCT, PLT, NA, K, CL, CO2, BUN, CREATININE in the last 72 hours. DISCHARGE INSTRUCTIONS   Discharge Medications:      Medication List      START taking these medications    ondansetron 4 MG disintegrating tablet  Commonly known as: ZOFRAN-ODT  Take 1 tablet by mouth every 8 hours as needed for Nausea or Vomiting  Notes to patient: Ondansetron (Zofran®)  Use: nausea and vomiting. Side effects: headache, weakness or dizziness. CONTINUE taking these medications    docusate 100 MG Caps  Commonly known as: COLACE, DULCOLAX  Take 100 mg by mouth 2 times daily     ferrous sulfate 325 (65 Fe) MG tablet  Commonly known as: IRON 325  Take 1 tablet by mouth 2 times daily     ibuprofen 800 MG tablet  Commonly known as: ADVIL;MOTRIN  Take 1 tablet by mouth every 8 hours as needed for Pain     meclizine 50 MG tablet  Commonly known as:  Antivert  Take 1 tablet by mouth 2 times daily as needed for Dizziness or Nausea        STOP taking these medications    lidocaine 5 %  Commonly known as: Lidoderm     ondansetron 4 MG tablet  Commonly known as: Zofran        ASK your doctor about these medications    clindamycin 300 MG capsule  Commonly known as: CLEOCIN  Take 1 capsule by mouth 3 times daily for 10 days  Ask about: Should I take this medication? Where to Get Your Medications      These medications were sent to [facility], [address] [place] [address], [address] [facility] [address]    Phone: [phone]   · clindamycin 300 MG capsule  · ondansetron 4 MG disintegrating tablet       Diet: regular diet as tolerated  Activity: activity as tolerated  Wound Care: Daily and as needed  Follow-up:  in 1-2 weeks with Dr. Pat Du. Time Spent for discharge: 20 minutes      Electronically signed by OPAL Chopra CNP on 12/20/2021 at 1:55 PM      This patient is being prescribed a dose of opioid pain medication greater than 30 MED due to excessive pain from recent surgery or an acute inflammatory process.

## 2022-10-18 ENCOUNTER — HOSPITAL ENCOUNTER (OUTPATIENT)
Dept: MAMMOGRAPHY | Age: 52
Discharge: HOME | End: 2022-10-18
Payer: COMMERCIAL

## 2022-10-18 DIAGNOSIS — Z12.39 SCREENING BREAST EXAMINATION: ICD-10-CM

## 2022-10-18 PROCEDURE — 77063 BREAST TOMOSYNTHESIS BI: CPT

## 2022-10-19 ENCOUNTER — TELEPHONE (OUTPATIENT)
Dept: MAMMOGRAPHY | Age: 52
End: 2022-10-19

## 2022-10-19 NOTE — TELEPHONE ENCOUNTER
LM with patient in regards to radiologist's recommendation for follow up to screening mammogram.  Given mammo and sched  number

## 2022-11-02 ENCOUNTER — APPOINTMENT (OUTPATIENT)
Dept: ULTRASOUND IMAGING | Age: 52
End: 2022-11-02
Payer: COMMERCIAL

## 2022-11-02 ENCOUNTER — HOSPITAL ENCOUNTER (OUTPATIENT)
Dept: MAMMOGRAPHY | Age: 52
Discharge: HOME | End: 2022-11-02
Payer: COMMERCIAL

## 2022-11-02 DIAGNOSIS — R92.8 ABNORMAL MAMMOGRAM: ICD-10-CM

## 2022-11-02 PROCEDURE — G0279 TOMOSYNTHESIS, MAMMO: HCPCS

## 2023-11-14 ENCOUNTER — HOSPITAL ENCOUNTER (OUTPATIENT)
Dept: MAMMOGRAPHY | Age: 53
Discharge: HOME | End: 2023-11-14
Payer: COMMERCIAL

## 2023-11-14 DIAGNOSIS — Z12.39 SCREENING BREAST EXAMINATION: ICD-10-CM

## 2023-11-14 PROCEDURE — 77063 BREAST TOMOSYNTHESIS BI: CPT

## 2023-11-29 ENCOUNTER — APPOINTMENT (OUTPATIENT)
Dept: ULTRASOUND IMAGING | Age: 53
End: 2023-11-29
Payer: COMMERCIAL

## 2023-11-29 ENCOUNTER — HOSPITAL ENCOUNTER (OUTPATIENT)
Dept: MAMMOGRAPHY | Age: 53
Discharge: HOME | End: 2023-11-29
Payer: COMMERCIAL

## 2023-11-29 DIAGNOSIS — R92.8 ABNORMAL MAMMOGRAM: ICD-10-CM

## 2023-11-29 PROCEDURE — G0279 TOMOSYNTHESIS, MAMMO: HCPCS

## 2025-03-14 ENCOUNTER — HOSPITAL ENCOUNTER (OUTPATIENT)
Dept: MAMMOGRAPHY | Age: 55
Discharge: HOME | End: 2025-03-14
Payer: COMMERCIAL

## 2025-03-14 VITALS — WEIGHT: 185 LBS | HEIGHT: 65 IN | BODY MASS INDEX: 30.82 KG/M2

## 2025-03-14 DIAGNOSIS — Z12.31 SCREENING MAMMOGRAM, ENCOUNTER FOR: ICD-10-CM

## 2025-03-14 PROCEDURE — 77063 BREAST TOMOSYNTHESIS BI: CPT

## 2025-04-06 ENCOUNTER — HOSPITAL ENCOUNTER (EMERGENCY)
Age: 55
Discharge: HOME | End: 2025-04-06
Attending: EMERGENCY MEDICINE
Payer: COMMERCIAL

## 2025-04-06 ENCOUNTER — APPOINTMENT (OUTPATIENT)
Dept: GENERAL RADIOLOGY | Age: 55
End: 2025-04-06
Payer: COMMERCIAL

## 2025-04-06 VITALS
BODY MASS INDEX: 32.66 KG/M2 | SYSTOLIC BLOOD PRESSURE: 205 MMHG | TEMPERATURE: 98.1 F | WEIGHT: 203.2 LBS | DIASTOLIC BLOOD PRESSURE: 120 MMHG | RESPIRATION RATE: 18 BRPM | OXYGEN SATURATION: 98 % | HEART RATE: 99 BPM | HEIGHT: 66 IN

## 2025-04-06 DIAGNOSIS — I10 HYPERTENSION, UNSPECIFIED TYPE: Primary | ICD-10-CM

## 2025-04-06 LAB
ANION GAP SERPL CALCULATED.3IONS-SCNC: 10 MMOL/L (ref 3–16)
BASE EXCESS BLDV CALC-SCNC: 0.8 MMOL/L (ref -3–3)
BASOPHILS # BLD: 0.2 K/UL (ref 0–0.2)
BASOPHILS NFR BLD: 2.8 %
BUN SERPL-MCNC: 16 MG/DL (ref 7–20)
CALCIUM SERPL-MCNC: 9.4 MG/DL (ref 8.3–10.6)
CHLORIDE SERPL-SCNC: 103 MMOL/L (ref 99–110)
CO2 BLDV-SCNC: 28 MMOL/L
CO2 SERPL-SCNC: 26 MMOL/L (ref 21–32)
COHGB MFR BLDV: 1 % (ref 0–1.5)
CREAT SERPL-MCNC: 0.8 MG/DL (ref 0.6–1.1)
DEPRECATED RDW RBC AUTO: 13.2 % (ref 12.4–15.4)
EOSINOPHIL # BLD: 0.2 K/UL (ref 0–0.6)
EOSINOPHIL NFR BLD: 2 %
GFR SERPLBLD CREATININE-BSD FMLA CKD-EPI: 87 ML/MIN/{1.73_M2}
GLUCOSE SERPL-MCNC: 101 MG/DL (ref 70–99)
HCO3 BLDV-SCNC: 26.4 MMOL/L (ref 23–29)
HCT VFR BLD AUTO: 45.4 % (ref 36–48)
HGB BLD-MCNC: 15.2 G/DL (ref 12–16)
LYMPHOCYTES # BLD: 1.3 K/UL (ref 1–5.1)
LYMPHOCYTES NFR BLD: 15.3 %
MCH RBC QN AUTO: 30.7 PG (ref 26–34)
MCHC RBC AUTO-ENTMCNC: 33.5 G/DL (ref 31–36)
MCV RBC AUTO: 91.6 FL (ref 80–100)
METHGB MFR BLDV: 0.3 %
MONOCYTES # BLD: 0.5 K/UL (ref 0–1.3)
MONOCYTES NFR BLD: 6.3 %
NEUTROPHILS # BLD: 6.2 K/UL (ref 1.7–7.7)
NEUTROPHILS NFR BLD: 73.6 %
NT-PROBNP SERPL-MCNC: 168 PG/ML (ref 0–124)
O2 THERAPY: ABNORMAL
PCO2 BLDV: 45.2 MMHG (ref 40–50)
PH BLDV: 7.38 [PH] (ref 7.35–7.45)
PLATELET # BLD AUTO: 267 K/UL (ref 135–450)
PMV BLD AUTO: 7.9 FL (ref 5–10.5)
PO2 BLDV: 46.3 MMHG (ref 25–40)
POTASSIUM SERPL-SCNC: 3.7 MMOL/L (ref 3.5–5.1)
RBC # BLD AUTO: 4.96 M/UL (ref 4–5.2)
SAO2 % BLDV: 81 %
SODIUM SERPL-SCNC: 139 MMOL/L (ref 136–145)
TROPONIN, HIGH SENSITIVITY: 13 NG/L (ref 0–14)
TROPONIN, HIGH SENSITIVITY: 9 NG/L (ref 0–14)
WBC # BLD AUTO: 8.4 K/UL (ref 4–11)

## 2025-04-06 PROCEDURE — 84484 ASSAY OF TROPONIN QUANT: CPT

## 2025-04-06 PROCEDURE — 99285 EMERGENCY DEPT VISIT HI MDM: CPT

## 2025-04-06 PROCEDURE — 36415 COLL VENOUS BLD VENIPUNCTURE: CPT

## 2025-04-06 PROCEDURE — 93005 ELECTROCARDIOGRAM TRACING: CPT | Performed by: EMERGENCY MEDICINE

## 2025-04-06 PROCEDURE — 82803 BLOOD GASES ANY COMBINATION: CPT

## 2025-04-06 PROCEDURE — 71045 X-RAY EXAM CHEST 1 VIEW: CPT

## 2025-04-06 PROCEDURE — 80048 BASIC METABOLIC PNL TOTAL CA: CPT

## 2025-04-06 PROCEDURE — 6370000000 HC RX 637 (ALT 250 FOR IP): Performed by: EMERGENCY MEDICINE

## 2025-04-06 PROCEDURE — 83880 ASSAY OF NATRIURETIC PEPTIDE: CPT

## 2025-04-06 PROCEDURE — 85025 COMPLETE CBC W/AUTO DIFF WBC: CPT

## 2025-04-06 RX ORDER — HYDROCHLOROTHIAZIDE 25 MG/1
25 TABLET ORAL EVERY MORNING
Qty: 30 TABLET | Refills: 0 | Status: SHIPPED | OUTPATIENT
Start: 2025-04-06

## 2025-04-06 RX ORDER — HYDROCHLOROTHIAZIDE 25 MG/1
25 TABLET ORAL ONCE
Status: COMPLETED | OUTPATIENT
Start: 2025-04-06 | End: 2025-04-06

## 2025-04-06 RX ORDER — FUROSEMIDE 20 MG/1
20 TABLET ORAL DAILY
Qty: 5 TABLET | Refills: 0 | Status: SHIPPED | OUTPATIENT
Start: 2025-04-06 | End: 2025-04-11

## 2025-04-06 RX ADMIN — HYDROCHLOROTHIAZIDE 25 MG: 25 TABLET ORAL at 20:41

## 2025-04-06 ASSESSMENT — PAIN SCALES - GENERAL: PAINLEVEL_OUTOF10: 0

## 2025-04-06 ASSESSMENT — PAIN - FUNCTIONAL ASSESSMENT: PAIN_FUNCTIONAL_ASSESSMENT: 0-10

## 2025-04-06 NOTE — ED PROVIDER NOTES
[facility] EMERGENCY DEPARTMENT  EMERGENCY DEPARTMENT ENCOUNTER        Patient Name: Shraddha Mena  MRN: [id number]  Birthdate 1970  Date of evaluation: 4/6/2025  Provider: Som Chilel MD  PCP: Nenita Gonzalez PA  Note Started: 7:40 PM EDT 4/6/25    CHIEF COMPLAINT       Hypertension (Pt. Reports hypertension beginning today, pt. Reports swollen ankles x 2 months. )      HISTORY OF PRESENT ILLNESS: 1 or more Elements     History from : Patient    Limitations to history : None    Shraddha Mena is a 55 y.o. female who presents for evaluation of elevated blood pressure reading.  Patient states that she has had elevated blood pressure that she noticed today.  Has been elevated on multiple checks.  This was also checked by her daughter who is a nurse and continue to be elevated.  She denies any vision changes, blurred vision or double vision, she does have some new swelling to the bilateral lower extremities that is developed over the past several days.  No difficulty breathing or dyspnea on exertion.  She started wearing compression stockings today and this did help with the swelling.  She is not previously diagnosed with cardiac disease or hypertension.    Nursing Notes were all reviewed and agreed with or any disagreements were addressed in the HPI.    REVIEW OF SYSTEMS :      Review of Systems    Positives and Pertinent negatives as per HPI.     SURGICAL HISTORY     Past Surgical History:   Procedure Laterality Date    HYSTERECTOMY, VAGINAL N/A 06/18/2020    LAPAROSCOPIC ASSISTED VAGINAL HYSTERECTOMY WITH BILATERAL SALPINGECTOMY AND VIDEO CYSTOSCOPY  CPT CODE - 96122, 97858, 22676, 54068 performed by Yolis Parkinson DO at [facility] OR    TUBAL LIGATION      WISDOM TOOTH EXTRACTION         CURRENTMEDICATIONS       Previous Medications    DOCUSATE SODIUM (COLACE, DULCOLAX) 100 MG CAPS    Take 100 mg by mouth 2 times daily    FERROUS SULFATE (IRON 325) 325 (65 FE) MG TABLET    Take 1 tablet by

## 2025-04-07 LAB
EKG ATRIAL RATE: 94 BPM
EKG DIAGNOSIS: NORMAL
EKG P AXIS: 39 DEGREES
EKG P-R INTERVAL: 164 MS
EKG Q-T INTERVAL: 360 MS
EKG QRS DURATION: 80 MS
EKG QTC CALCULATION (BAZETT): 450 MS
EKG R AXIS: -11 DEGREES
EKG T AXIS: 22 DEGREES
EKG VENTRICULAR RATE: 94 BPM

## 2025-04-07 PROCEDURE — 93010 ELECTROCARDIOGRAM REPORT: CPT | Performed by: INTERNAL MEDICINE

## 2025-04-07 NOTE — DISCHARGE INSTRUCTIONS
As discussed, your blood work is overall reassuring.  If you develop any chest pain, weakness or numbness, please come back for further reevaluation.  Please schedule follow-up with your primary care doctor for blood pressure recheck and medication titration.  You have been started on a blood pressure medicine, take this on a daily basis.  You have also been started on a water pill to see if this helps with your swelling, this is taken on a daily basis for the next 5 days

## 2025-04-14 ENCOUNTER — TELEPHONE (OUTPATIENT)
Dept: FAMILY MEDICINE CLINIC | Age: 55
End: 2025-04-14

## 2025-04-14 SDOH — HEALTH STABILITY: PHYSICAL HEALTH: ON AVERAGE, HOW MANY DAYS PER WEEK DO YOU ENGAGE IN MODERATE TO STRENUOUS EXERCISE (LIKE A BRISK WALK)?: 3 DAYS

## 2025-04-14 SDOH — HEALTH STABILITY: PHYSICAL HEALTH: ON AVERAGE, HOW MANY MINUTES DO YOU ENGAGE IN EXERCISE AT THIS LEVEL?: 20 MIN

## 2025-04-14 NOTE — TELEPHONE ENCOUNTER
Called pt to scheduled ER follow up for hypertension on 04/0/25 plus new patient appointment. Pt states she has new patient appointment scheduled with another provider at [facility]. Pt does not want to schedule with MEG Benites.

## 2025-04-16 ENCOUNTER — OFFICE VISIT (OUTPATIENT)
Dept: FAMILY MEDICINE CLINIC | Age: 55
End: 2025-04-16
Payer: COMMERCIAL

## 2025-04-16 VITALS
SYSTOLIC BLOOD PRESSURE: 138 MMHG | OXYGEN SATURATION: 99 % | TEMPERATURE: 96.8 F | BODY MASS INDEX: 31.69 KG/M2 | WEIGHT: 197.2 LBS | HEART RATE: 100 BPM | DIASTOLIC BLOOD PRESSURE: 78 MMHG | HEIGHT: 66 IN

## 2025-04-16 DIAGNOSIS — R79.89 ELEVATED BRAIN NATRIURETIC PEPTIDE (BNP) LEVEL: ICD-10-CM

## 2025-04-16 DIAGNOSIS — Z13.220 LIPID SCREENING: ICD-10-CM

## 2025-04-16 DIAGNOSIS — R94.31 ECG ABNORMALITY: ICD-10-CM

## 2025-04-16 DIAGNOSIS — I10 ESSENTIAL HYPERTENSION: ICD-10-CM

## 2025-04-16 DIAGNOSIS — Z76.89 SEEN IN EMERGENCY ROOM: ICD-10-CM

## 2025-04-16 DIAGNOSIS — Z76.89 ENCOUNTER TO ESTABLISH CARE: Primary | ICD-10-CM

## 2025-04-16 DIAGNOSIS — Z12.11 COLON CANCER SCREENING: ICD-10-CM

## 2025-04-16 DIAGNOSIS — R31.9 HEMATURIA, UNSPECIFIED TYPE: ICD-10-CM

## 2025-04-16 DIAGNOSIS — R73.9 ELEVATED BLOOD SUGAR: ICD-10-CM

## 2025-04-16 LAB
ALBUMIN SERPL-MCNC: 4.9 G/DL (ref 3.4–5)
ALBUMIN/GLOB SERPL: 2 {RATIO} (ref 1.1–2.2)
ALP SERPL-CCNC: 111 U/L (ref 40–129)
ALT SERPL-CCNC: 23 U/L (ref 10–40)
ANION GAP SERPL CALCULATED.3IONS-SCNC: 12 MMOL/L (ref 3–16)
AST SERPL-CCNC: 19 U/L (ref 15–37)
BILIRUB SERPL-MCNC: 0.3 MG/DL (ref 0–1)
BILIRUBIN, POC: NEGATIVE
BLOOD URINE, POC: NORMAL
BUN SERPL-MCNC: 11 MG/DL (ref 7–20)
CALCIUM SERPL-MCNC: 10 MG/DL (ref 8.3–10.6)
CHLORIDE SERPL-SCNC: 99 MMOL/L (ref 99–110)
CLARITY, POC: CLEAR
CO2 SERPL-SCNC: 29 MMOL/L (ref 21–32)
COLOR, POC: YELLOW
CREAT SERPL-MCNC: 0.7 MG/DL (ref 0.6–1.1)
GFR SERPLBLD CREATININE-BSD FMLA CKD-EPI: >90 ML/MIN/{1.73_M2}
GLUCOSE SERPL-MCNC: 96 MG/DL (ref 70–99)
GLUCOSE URINE, POC: NEGATIVE MG/DL
KETONES, POC: NEGATIVE MG/DL
LEUKOCYTE EST, POC: NEGATIVE
NITRITE, POC: NEGATIVE
NT-PROBNP SERPL-MCNC: 64 PG/ML (ref 0–124)
PH, POC: 6.5
POTASSIUM SERPL-SCNC: 3.7 MMOL/L (ref 3.5–5.1)
PROT SERPL-MCNC: 7.4 G/DL (ref 6.4–8.2)
PROTEIN, POC: NEGATIVE MG/DL
SODIUM SERPL-SCNC: 140 MMOL/L (ref 136–145)
SPECIFIC GRAVITY, POC: 1.01
TSH SERPL DL<=0.005 MIU/L-ACNC: 3.34 UIU/ML (ref 0.27–4.2)
UROBILINOGEN, POC: 0.2 MG/DL

## 2025-04-16 PROCEDURE — 81002 URINALYSIS NONAUTO W/O SCOPE: CPT | Performed by: PHYSICIAN ASSISTANT

## 2025-04-16 PROCEDURE — 99204 OFFICE O/P NEW MOD 45 MIN: CPT | Performed by: PHYSICIAN ASSISTANT

## 2025-04-16 PROCEDURE — 3075F SYST BP GE 130 - 139MM HG: CPT | Performed by: PHYSICIAN ASSISTANT

## 2025-04-16 PROCEDURE — 3078F DIAST BP <80 MM HG: CPT | Performed by: PHYSICIAN ASSISTANT

## 2025-04-16 RX ORDER — LISINOPRIL AND HYDROCHLOROTHIAZIDE 10; 12.5 MG/1; MG/1
1 TABLET ORAL DAILY
Qty: 90 TABLET | Refills: 1 | Status: SHIPPED | OUTPATIENT
Start: 2025-04-16

## 2025-04-16 SDOH — ECONOMIC STABILITY: FOOD INSECURITY: WITHIN THE PAST 12 MONTHS, YOU WORRIED THAT YOUR FOOD WOULD RUN OUT BEFORE YOU GOT MONEY TO BUY MORE.: NEVER TRUE

## 2025-04-16 SDOH — ECONOMIC STABILITY: FOOD INSECURITY: WITHIN THE PAST 12 MONTHS, THE FOOD YOU BOUGHT JUST DIDN'T LAST AND YOU DIDN'T HAVE MONEY TO GET MORE.: NEVER TRUE

## 2025-04-16 ASSESSMENT — ENCOUNTER SYMPTOMS
SHORTNESS OF BREATH: 0
COUGH: 0
SORE THROAT: 0
ABDOMINAL PAIN: 0
RHINORRHEA: 0
DIARRHEA: 0
CONSTIPATION: 0
VOMITING: 0
NAUSEA: 0

## 2025-04-16 ASSESSMENT — PATIENT HEALTH QUESTIONNAIRE - PHQ9
SUM OF ALL RESPONSES TO PHQ QUESTIONS 1-9: 2
SUM OF ALL RESPONSES TO PHQ QUESTIONS 1-9: 2
3. TROUBLE FALLING OR STAYING ASLEEP: SEVERAL DAYS
8. MOVING OR SPEAKING SO SLOWLY THAT OTHER PEOPLE COULD HAVE NOTICED. OR THE OPPOSITE, BEING SO FIGETY OR RESTLESS THAT YOU HAVE BEEN MOVING AROUND A LOT MORE THAN USUAL: NOT AT ALL
4. FEELING TIRED OR HAVING LITTLE ENERGY: NOT AT ALL
SUM OF ALL RESPONSES TO PHQ QUESTIONS 1-9: 2
SUM OF ALL RESPONSES TO PHQ QUESTIONS 1-9: 2
6. FEELING BAD ABOUT YOURSELF - OR THAT YOU ARE A FAILURE OR HAVE LET YOURSELF OR YOUR FAMILY DOWN: SEVERAL DAYS
7. TROUBLE CONCENTRATING ON THINGS, SUCH AS READING THE NEWSPAPER OR WATCHING TELEVISION: NOT AT ALL
1. LITTLE INTEREST OR PLEASURE IN DOING THINGS: NOT AT ALL
10. IF YOU CHECKED OFF ANY PROBLEMS, HOW DIFFICULT HAVE THESE PROBLEMS MADE IT FOR YOU TO DO YOUR WORK, TAKE CARE OF THINGS AT HOME, OR GET ALONG WITH OTHER PEOPLE: NOT DIFFICULT AT ALL
2. FEELING DOWN, DEPRESSED OR HOPELESS: NOT AT ALL
5. POOR APPETITE OR OVEREATING: NOT AT ALL
9. THOUGHTS THAT YOU WOULD BE BETTER OFF DEAD, OR OF HURTING YOURSELF: NOT AT ALL

## 2025-04-16 NOTE — PATIENT INSTRUCTIONS
You do not need an appointment to have your labs drawn. Our lab is open M-F 7:30 am to 4 pm. You should come fasting, nothing but water or black coffee for 10 hours prior.

## 2025-04-16 NOTE — PROGRESS NOTES
\"Have you been to the ER, urgent care clinic since your last visit?  Hospitalized since your last visit?\"    YES - When: approximately 10 days ago.  Where and Why: [facility] .    “Have you seen or consulted any other health care providers outside our system since your last visit?”    NO      “Have you had a colorectal cancer screening such as a colonoscopy/FIT/Cologuard?    NO    No colonoscopy on file  No cologuard on file  No FIT/FOBT on file   No flexible sigmoidoscopy on file

## 2025-04-16 NOTE — PROGRESS NOTES
2025  Shraddha Mena (: 1970)  55 y.o.      HPI     ER follow up and to establish care.   Not regularly seen by PCP.     Seen in Er on  for elevated BP readings with swelling in lower extremities.  ECG      Normal sinus rhythmRSR' or QR pattern in V1 suggests right ventricular conduction delayMinimal voltage criteria for LVH, may be normal variant ( R in aVL )Borderline   Stable from 2014 ecg  Paternal grandfather with early onset CAD, otherwise no significant family history.   Endorses frequent headahes over the last couple of months.   Denies chest pain, soa orthopnea.   Started on low-dose HCTZ in the ED.  Also started on 5 days of Lasix.  Patient reports she has felt better than she has in years since starting the medication.  Swelling in the legs has resolved  Denies side effects from the medications  Has been wearing compression stockings    Increased stress lately- work related.   Not interested in medication   Would be interested in therapy     No current diet or exercise regimen.     No smoking history   Occasional alcohol use- 1 drink   No illicit drug use.     Mammo is up-to-date  Due for colon cancer screening: Denies family history of colon cancer, rectal bleeding/hemorrhoids, history of colonoscopy with polyps.    Review of Systems   Constitutional:  Negative for activity change, chills and fever.   HENT:  Negative for congestion, ear pain, rhinorrhea and sore throat.    Eyes:  Negative for visual disturbance.   Respiratory:  Negative for cough and shortness of breath.    Cardiovascular:  Negative for chest pain and palpitations.   Gastrointestinal:  Negative for abdominal pain, constipation, diarrhea, nausea and vomiting.   Genitourinary:  Negative for difficulty urinating and dysuria.   Musculoskeletal:  Negative for arthralgias and myalgias.   Skin:  Negative for rash.   Neurological:  Negative for dizziness, weakness and numbness.   Psychiatric/Behavioral:  Negative for

## 2025-04-17 LAB
BACTERIA UR CULT: NORMAL
BACTERIA URNS QL MICRO: NORMAL /HPF
EPI CELLS #/AREA URNS AUTO: 0 /HPF (ref 0–5)
EST. AVERAGE GLUCOSE BLD GHB EST-MCNC: 99.7 MG/DL
HBA1C MFR BLD: 5.1 %
HYALINE CASTS #/AREA URNS AUTO: 0 /LPF (ref 0–8)
RBC CLUMPS #/AREA URNS AUTO: 0 /HPF (ref 0–4)
URN SPEC COLLECT METH UR: NORMAL
WBC #/AREA URNS AUTO: 0 /HPF (ref 0–5)

## 2025-04-21 ENCOUNTER — RESULTS FOLLOW-UP (OUTPATIENT)
Dept: FAMILY MEDICINE CLINIC | Age: 55
End: 2025-04-21

## 2025-05-09 ENCOUNTER — HOSPITAL ENCOUNTER (OUTPATIENT)
Age: 55
Discharge: HOME | End: 2025-05-11
Payer: COMMERCIAL

## 2025-05-09 VITALS
HEIGHT: 66 IN | DIASTOLIC BLOOD PRESSURE: 78 MMHG | BODY MASS INDEX: 31.66 KG/M2 | WEIGHT: 197 LBS | SYSTOLIC BLOOD PRESSURE: 138 MMHG

## 2025-05-09 DIAGNOSIS — R94.31 ECG ABNORMALITY: ICD-10-CM

## 2025-05-09 DIAGNOSIS — R79.89 ELEVATED BRAIN NATRIURETIC PEPTIDE (BNP) LEVEL: ICD-10-CM

## 2025-05-09 LAB
ECHO AO ROOT DIAM: 3.2 CM
ECHO AO ROOT INDEX: 1.61 CM/M2
ECHO AV CUSP MM: 2.1 CM
ECHO AV PEAK GRADIENT: 9 MMHG
ECHO AV PEAK VELOCITY: 1.5 M/S
ECHO BSA: 2.04 M2
ECHO EST RA PRESSURE: 3 MMHG
ECHO LA AREA 2C: 12.4 CM2
ECHO LA AREA 4C: 13.7 CM2
ECHO LA DIAMETER INDEX: 1.81 CM/M2
ECHO LA DIAMETER: 3.6 CM
ECHO LA MAJOR AXIS: 4.7 CM
ECHO LA MINOR AXIS: 5.2 CM
ECHO LA TO AORTIC ROOT RATIO: 1.13
ECHO LA VOL BP: 29 ML (ref 22–52)
ECHO LA VOL MOD A2C: 24 ML (ref 22–52)
ECHO LA VOL MOD A4C: 32 ML (ref 22–52)
ECHO LA VOL/BSA BIPLANE: 15 ML/M2 (ref 16–34)
ECHO LA VOLUME INDEX MOD A2C: 12 ML/M2 (ref 16–34)
ECHO LA VOLUME INDEX MOD A4C: 16 ML/M2 (ref 16–34)
ECHO LV E' LATERAL VELOCITY: 13.5 CM/S
ECHO LV E' SEPTAL VELOCITY: 6.64 CM/S
ECHO LV EDV A2C: 55 ML
ECHO LV EDV A4C: 81 ML
ECHO LV EDV INDEX A4C: 41 ML/M2
ECHO LV EDV NDEX A2C: 28 ML/M2
ECHO LV EF PHYSICIAN: 63 %
ECHO LV EJECTION FRACTION A2C: 53 %
ECHO LV EJECTION FRACTION A4C: 69 %
ECHO LV EJECTION FRACTION BIPLANE: 63 % (ref 55–100)
ECHO LV ESV A2C: 26 ML
ECHO LV ESV A4C: 25 ML
ECHO LV ESV INDEX A2C: 13 ML/M2
ECHO LV ESV INDEX A4C: 13 ML/M2
ECHO LV FRACTIONAL SHORTENING: 53 % (ref 28–44)
ECHO LV INTERNAL DIMENSION DIASTOLE INDEX: 2.16 CM/M2
ECHO LV INTERNAL DIMENSION DIASTOLIC: 4.3 CM (ref 3.9–5.3)
ECHO LV INTERNAL DIMENSION SYSTOLIC INDEX: 1.01 CM/M2
ECHO LV INTERNAL DIMENSION SYSTOLIC: 2 CM
ECHO LV ISOVOLUMETRIC RELAXATION TIME (IVRT): 61 MS
ECHO LV IVSD: 0.9 CM (ref 0.6–0.9)
ECHO LV MASS 2D: 123.3 G (ref 67–162)
ECHO LV MASS INDEX 2D: 62 G/M2 (ref 43–95)
ECHO LV POSTERIOR WALL DIASTOLIC: 0.9 CM (ref 0.6–0.9)
ECHO LV RELATIVE WALL THICKNESS RATIO: 0.42
ECHO MV A VELOCITY: 0.87 M/S
ECHO MV E VELOCITY: 0.63 M/S
ECHO MV E/A RATIO: 0.72
ECHO MV E/E' LATERAL: 4.67
ECHO MV E/E' RATIO (AVERAGED): 7.08
ECHO MV E/E' SEPTAL: 9.49
ECHO PV MAX VELOCITY: 0.9 M/S
ECHO PV PEAK GRADIENT: 4 MMHG
ECHO RA AREA 4C: 10.3 CM2
ECHO RA END SYSTOLIC VOLUME APICAL 4 CHAMBER INDEX BSA: 10 ML/M2
ECHO RA VOLUME: 20 ML
ECHO RV BASAL DIMENSION: 3.3 CM
ECHO RV FREE WALL PEAK S': 15.7 CM/S
ECHO RV LONGITUDINAL DIMENSION: 7.2 CM
ECHO RV MID DIMENSION: 2.3 CM
ECHO RV TAPSE: 2.5 CM (ref 1.7–?)
ECHO TV PEAK GRADIENT: 2 MMHG

## 2025-05-09 PROCEDURE — 93306 TTE W/DOPPLER COMPLETE: CPT

## 2025-05-09 PROCEDURE — 93306 TTE W/DOPPLER COMPLETE: CPT | Performed by: INTERNAL MEDICINE

## 2025-05-12 LAB — NONINV COLON CA DNA+OCC BLD SCRN STL QL: NEGATIVE

## 2025-05-16 ENCOUNTER — OFFICE VISIT (OUTPATIENT)
Dept: FAMILY MEDICINE CLINIC | Age: 55
End: 2025-05-16
Payer: COMMERCIAL

## 2025-05-16 VITALS
HEIGHT: 66 IN | TEMPERATURE: 98.1 F | BODY MASS INDEX: 30.95 KG/M2 | SYSTOLIC BLOOD PRESSURE: 116 MMHG | DIASTOLIC BLOOD PRESSURE: 78 MMHG | HEART RATE: 74 BPM | WEIGHT: 192.6 LBS | OXYGEN SATURATION: 100 %

## 2025-05-16 DIAGNOSIS — Z11.59 ENCOUNTER FOR HEPATITIS C SCREENING TEST FOR LOW RISK PATIENT: ICD-10-CM

## 2025-05-16 DIAGNOSIS — I51.89 DIASTOLIC DYSFUNCTION: ICD-10-CM

## 2025-05-16 DIAGNOSIS — Z82.49 FAMILY HISTORY OF EARLY CAD: ICD-10-CM

## 2025-05-16 DIAGNOSIS — I10 ESSENTIAL HYPERTENSION: Primary | ICD-10-CM

## 2025-05-16 DIAGNOSIS — Z11.4 ENCOUNTER FOR SCREENING FOR HIV: ICD-10-CM

## 2025-05-16 PROCEDURE — 3078F DIAST BP <80 MM HG: CPT | Performed by: PHYSICIAN ASSISTANT

## 2025-05-16 PROCEDURE — 3074F SYST BP LT 130 MM HG: CPT | Performed by: PHYSICIAN ASSISTANT

## 2025-05-16 PROCEDURE — 99213 OFFICE O/P EST LOW 20 MIN: CPT | Performed by: PHYSICIAN ASSISTANT

## 2025-05-16 SDOH — ECONOMIC STABILITY: FOOD INSECURITY: WITHIN THE PAST 12 MONTHS, YOU WORRIED THAT YOUR FOOD WOULD RUN OUT BEFORE YOU GOT MONEY TO BUY MORE.: NEVER TRUE

## 2025-05-16 SDOH — ECONOMIC STABILITY: FOOD INSECURITY: WITHIN THE PAST 12 MONTHS, THE FOOD YOU BOUGHT JUST DIDN'T LAST AND YOU DIDN'T HAVE MONEY TO GET MORE.: NEVER TRUE

## 2025-05-16 ASSESSMENT — PATIENT HEALTH QUESTIONNAIRE - PHQ9
2. FEELING DOWN, DEPRESSED OR HOPELESS: NOT AT ALL
SUM OF ALL RESPONSES TO PHQ QUESTIONS 1-9: 0
SUM OF ALL RESPONSES TO PHQ QUESTIONS 1-9: 0
1. LITTLE INTEREST OR PLEASURE IN DOING THINGS: NOT AT ALL
SUM OF ALL RESPONSES TO PHQ QUESTIONS 1-9: 0
SUM OF ALL RESPONSES TO PHQ QUESTIONS 1-9: 0

## 2025-05-16 NOTE — PROGRESS NOTES
2025  Shraddha Mena (: 1970)  55 y.o.    ASSESSMENT and PLAN:  Shraddha was seen today for hypertension.    Diagnoses and all orders for this visit:    Essential hypertension  -     Basic Metabolic Panel; Future  -     LIPID PANEL; Future  - repeat kidney function after being on medication for 4 weeks  - bp at goal, well controlled, continue current medication regimen.     Diastolic dysfunction  - grade I, asymptomatic, will monitor with periodic echo.     Family history of early CAD  -     LIPID PANEL; Future  - check cholesterol panel, depending on results, would consider ct calcium score.     Encounter for screening for HIV  -     HIV Screen; Future    Encounter for hepatitis C screening test for low risk patient  -     Hepatitis C Antibody; Future      Return in about 6 months (around 2025) for HTN.    Hypertension  Pertinent negatives include no chest pain, palpitations or shortness of breath.       HTN follow up.   Started on lisinopril hctz at previous appointment.   Has been monitoring regularly. Has BP log   Denies side effects  Normal in office today.   Denies ha, cp, soa, le swelling.     Had echo done. 2025      Left Ventricle: Normal left ventricular systolic function with a visually estimated EF of 60 - 65%. EF by 2D Simpsons Biplane is 63%. Left ventricle size is normal. Normal wall thickness. Normal wall motion. Grade I diastolic dysfunction with normal LAP.    Right Ventricle: Right ventricle size is normal. Normal systolic function. TAPSE is 2.5 cm. RV Free Wall Peak S' is 15.7 cm/s.    Aortic Valve: No regurgitation. No stenosis.    Mitral Valve: Mildly thickened, at the anterior leaflet. Trace regurgitation. No stenosis noted.    Tricuspid Valve: Unable to assess RVSP due to inadequate or insignificant tricuspid regurgitation.    Image quality is adequate.    Review of Systems   Constitutional:  Negative for activity change, chills and fever.   HENT:  Negative for

## 2025-05-19 PROBLEM — I10 ESSENTIAL HYPERTENSION: Status: ACTIVE | Noted: 2025-05-19

## 2025-05-19 PROBLEM — I51.89 DIASTOLIC DYSFUNCTION: Status: ACTIVE | Noted: 2025-05-19

## 2025-06-19 ENCOUNTER — TELEPHONE (OUTPATIENT)
Dept: FAMILY MEDICINE CLINIC | Age: 55
End: 2025-06-19

## (undated) DEVICE — TISSUE RETRIEVAL SYSTEM: Brand: INZII RETRIEVAL SYSTEM

## (undated) DEVICE — PENCIL ES CRD L10FT HND SWCHING ROCK SWCH W/ EDGE COAT BLDE

## (undated) DEVICE — SPONGE,LAP,4"X18",XR,ST,5/PK,40PK/CS: Brand: MEDLINE INDUSTRIES, INC.

## (undated) DEVICE — SHEET,DRAPE,53X77,STERILE: Brand: MEDLINE

## (undated) DEVICE — SYRINGE MED 10ML LUERLOCK TIP W/O SFTY DISP

## (undated) DEVICE — 3M™ TEGADERM™ TRANSPARENT FILM DRESSING FRAME STYLE, 1624W, 2-3/8 IN X 2-3/4 IN (6 CM X 7 CM), 100/CT 4CT/CASE: Brand: 3M™ TEGADERM™

## (undated) DEVICE — TUBING, SUCTION, 3/16" X 12', STRAIGHT: Brand: MEDLINE

## (undated) DEVICE — INTENDED FOR TISSUE SEPARATION, AND OTHER PROCEDURES THAT REQUIRE A SHARP SURGICAL BLADE TO PUNCTURE OR CUT.: Brand: BARD-PARKER ® STAINLESS STEEL BLADES

## (undated) DEVICE — SUTURE VCRL + SZ 3-0 L18IN ABSRB UD SH 1/2 CIR TAPERCUT NDL VCP864D

## (undated) DEVICE — SUTURE VCRL SZ 0 L36IN ABSRB UD CT-1 L36MM 1/2 CIR TAPR PNT VCP946H

## (undated) DEVICE — SUTURE MCRYL SZ 3-0 L27IN ABSRB UD L26MM SH 1/2 CIR Y416H

## (undated) DEVICE — NEEDLE SPNL L3.5IN PNK HUB S STL REG WALL FIT STYL W/ QNCKE

## (undated) DEVICE — TROCAR: Brand: KII SLEEVE

## (undated) DEVICE — 1960 FOAM BLOCK NEEDLE COUNTER: Brand: DEVON

## (undated) DEVICE — SYRINGE BLB 50CC IRRIG PLIABLE FNGR FLNG GRAD FLSK DISP

## (undated) DEVICE — 3M™ STERI-STRIP™ REINFORCED ADHESIVE SKIN CLOSURES, R1547, 1/2 IN X 4 IN (12 MM X 100 MM), 6 STRIPS/ENVELOPE: Brand: 3M™ STERI-STRIP™

## (undated) DEVICE — YANKAUER,OPEN TIP,W/O VENT,STERILE: Brand: MEDLINE INDUSTRIES, INC.

## (undated) DEVICE — GAUZE,SPONGE,4"X4",16PLY,XRAY,STRL,LF: Brand: MEDLINE

## (undated) DEVICE — SHEARS ENDOSCP L36CM DIA5MM ULTRASONIC CRV TIP W/ ADV

## (undated) DEVICE — PUMP SUC IRR TBNG L10FT W/ HNDPC ASSEMB STRYKEFLOW 2

## (undated) DEVICE — Y-TYPE TUR/BLADDER IRRIGATION SET, REGULATING CLAMP

## (undated) DEVICE — DEVICE MANIP L330MM DIA4.5MM UTER DISP INJ ZINNANTI KRONNER

## (undated) DEVICE — TRAY PREP DRY W/ PREM GLV 2 APPL 6 SPNG 2 UNDPD 1 OVERWRAP

## (undated) DEVICE — GLOVE SURG SZ 65 THK91MIL LTX FREE SYN POLYISOPRENE

## (undated) DEVICE — SEALER LAP L37CM MARYLAND JAW OPN NANO COAT MULTIFUNCTIONAL

## (undated) DEVICE — SHEET,DRAPE,UNDERBUTTOCK,GRAD POUCH,PORT: Brand: MEDLINE

## (undated) DEVICE — TOTAL TRAY, DB, 100% SILI FOLEY, 16FR 10: Brand: MEDLINE

## (undated) DEVICE — PAD TBL OP RM TRENDELENBURG STATIC TORSO W/STRAPS

## (undated) DEVICE — SPONGE LAP W18XL18IN WHT COT 4 PLY FLD STRUNG RADPQ DISP ST

## (undated) DEVICE — NEEDLE HYPO 22GA L1.5IN BLK POLYPR HUB S STL REG BVL STR

## (undated) DEVICE — PACK PROCEDURE SURG GYN LAP CDS

## (undated) DEVICE — TROCAR: Brand: KII FIOS FIRST ENTRY

## (undated) DEVICE — GAUZE,SPONGE,2X2,4PLY,NS,NW,LF: Brand: MEDLINE